# Patient Record
Sex: MALE | Race: WHITE | NOT HISPANIC OR LATINO | Employment: OTHER | URBAN - METROPOLITAN AREA
[De-identification: names, ages, dates, MRNs, and addresses within clinical notes are randomized per-mention and may not be internally consistent; named-entity substitution may affect disease eponyms.]

---

## 2024-01-28 ENCOUNTER — APPOINTMENT (OUTPATIENT)
Dept: CARDIOLOGY | Facility: MEDICAL CENTER | Age: 69
DRG: 322 | End: 2024-01-28
Attending: HOSPITALIST
Payer: MEDICARE

## 2024-01-28 ENCOUNTER — HOSPITAL ENCOUNTER (INPATIENT)
Facility: MEDICAL CENTER | Age: 69
LOS: 5 days | DRG: 322 | End: 2024-02-02
Attending: STUDENT IN AN ORGANIZED HEALTH CARE EDUCATION/TRAINING PROGRAM | Admitting: HOSPITALIST
Payer: MEDICARE

## 2024-01-28 DIAGNOSIS — I21.4 NSTEMI (NON-ST ELEVATED MYOCARDIAL INFARCTION) (HCC): ICD-10-CM

## 2024-01-28 DIAGNOSIS — A04.72 C. DIFFICILE COLITIS: ICD-10-CM

## 2024-01-28 PROBLEM — R73.03 PREDIABETES: Status: ACTIVE | Noted: 2024-01-28

## 2024-01-28 PROBLEM — Z98.890 HISTORY OF MITRAL VALVE REPAIR: Status: ACTIVE | Noted: 2024-01-28

## 2024-01-28 PROBLEM — R79.89 ELEVATED BRAIN NATRIURETIC PEPTIDE (BNP) LEVEL: Status: ACTIVE | Noted: 2024-01-28

## 2024-01-28 PROBLEM — I49.9 ARRHYTHMIA: Status: ACTIVE | Noted: 2024-01-28

## 2024-01-28 PROBLEM — R94.31 ABNORMAL EKG: Status: ACTIVE | Noted: 2024-01-28

## 2024-01-28 LAB
ALBUMIN SERPL BCP-MCNC: 4.1 G/DL (ref 3.2–4.9)
ALBUMIN/GLOB SERPL: 1.3 G/DL
ALP SERPL-CCNC: 43 U/L (ref 30–99)
ALT SERPL-CCNC: 49 U/L (ref 2–50)
ANION GAP SERPL CALC-SCNC: 10 MMOL/L (ref 7–16)
APTT PPP: 35.7 SEC (ref 24.7–36)
AST SERPL-CCNC: 25 U/L (ref 12–45)
BASOPHILS # BLD AUTO: 0.5 % (ref 0–1.8)
BASOPHILS # BLD: 0.06 K/UL (ref 0–0.12)
BILIRUB SERPL-MCNC: 0.6 MG/DL (ref 0.1–1.5)
BUN SERPL-MCNC: 14 MG/DL (ref 8–22)
CALCIUM ALBUM COR SERPL-MCNC: 9.2 MG/DL (ref 8.5–10.5)
CALCIUM SERPL-MCNC: 9.3 MG/DL (ref 8.5–10.5)
CHLORIDE SERPL-SCNC: 104 MMOL/L (ref 96–112)
CO2 SERPL-SCNC: 23 MMOL/L (ref 20–33)
CREAT SERPL-MCNC: 0.75 MG/DL (ref 0.5–1.4)
EOSINOPHIL # BLD AUTO: 0.05 K/UL (ref 0–0.51)
EOSINOPHIL NFR BLD: 0.4 % (ref 0–6.9)
ERYTHROCYTE [DISTWIDTH] IN BLOOD BY AUTOMATED COUNT: 48.3 FL (ref 35.9–50)
EST. AVERAGE GLUCOSE BLD GHB EST-MCNC: 123 MG/DL
GFR SERPLBLD CREATININE-BSD FMLA CKD-EPI: 99 ML/MIN/1.73 M 2
GLOBULIN SER CALC-MCNC: 3.1 G/DL (ref 1.9–3.5)
GLUCOSE BLD STRIP.AUTO-MCNC: 123 MG/DL (ref 65–99)
GLUCOSE BLD STRIP.AUTO-MCNC: 95 MG/DL (ref 65–99)
GLUCOSE BLD STRIP.AUTO-MCNC: 98 MG/DL (ref 65–99)
GLUCOSE SERPL-MCNC: 128 MG/DL (ref 65–99)
HBA1C MFR BLD: 5.9 % (ref 4–5.6)
HCT VFR BLD AUTO: 46.9 % (ref 42–52)
HGB BLD-MCNC: 15.9 G/DL (ref 14–18)
IMM GRANULOCYTES # BLD AUTO: 0.04 K/UL (ref 0–0.11)
IMM GRANULOCYTES NFR BLD AUTO: 0.3 % (ref 0–0.9)
INR PPP: 1 (ref 0.87–1.13)
LYMPHOCYTES # BLD AUTO: 0.81 K/UL (ref 1–4.8)
LYMPHOCYTES NFR BLD: 6.7 % (ref 22–41)
MCH RBC QN AUTO: 30.2 PG (ref 27–33)
MCHC RBC AUTO-ENTMCNC: 33.9 G/DL (ref 32.3–36.5)
MCV RBC AUTO: 89 FL (ref 81.4–97.8)
MONOCYTES # BLD AUTO: 0.79 K/UL (ref 0–0.85)
MONOCYTES NFR BLD AUTO: 6.6 % (ref 0–13.4)
NEUTROPHILS # BLD AUTO: 10.3 K/UL (ref 1.82–7.42)
NEUTROPHILS NFR BLD: 85.5 % (ref 44–72)
NRBC # BLD AUTO: 0 K/UL
NRBC BLD-RTO: 0 /100 WBC (ref 0–0.2)
NT-PROBNP SERPL IA-MCNC: 614 PG/ML (ref 0–125)
PLATELET # BLD AUTO: 219 K/UL (ref 164–446)
PMV BLD AUTO: 10.1 FL (ref 9–12.9)
POTASSIUM SERPL-SCNC: 3.9 MMOL/L (ref 3.6–5.5)
PROT SERPL-MCNC: 7.2 G/DL (ref 6–8.2)
PROTHROMBIN TIME: 13.3 SEC (ref 12–14.6)
RBC # BLD AUTO: 5.27 M/UL (ref 4.7–6.1)
SODIUM SERPL-SCNC: 137 MMOL/L (ref 135–145)
TROPONIN T SERPL-MCNC: 59 NG/L (ref 6–19)
TROPONIN T SERPL-MCNC: 62 NG/L (ref 6–19)
UFH PPP CHRO-ACNC: 0.15 IU/ML
UFH PPP CHRO-ACNC: 0.21 IU/ML
WBC # BLD AUTO: 12.1 K/UL (ref 4.8–10.8)

## 2024-01-28 PROCEDURE — 36415 COLL VENOUS BLD VENIPUNCTURE: CPT

## 2024-01-28 PROCEDURE — 82962 GLUCOSE BLOOD TEST: CPT | Mod: 91

## 2024-01-28 PROCEDURE — 85610 PROTHROMBIN TIME: CPT

## 2024-01-28 PROCEDURE — 84484 ASSAY OF TROPONIN QUANT: CPT

## 2024-01-28 PROCEDURE — 700102 HCHG RX REV CODE 250 W/ 637 OVERRIDE(OP)

## 2024-01-28 PROCEDURE — 85730 THROMBOPLASTIN TIME PARTIAL: CPT

## 2024-01-28 PROCEDURE — 80053 COMPREHEN METABOLIC PANEL: CPT

## 2024-01-28 PROCEDURE — A9270 NON-COVERED ITEM OR SERVICE: HCPCS

## 2024-01-28 PROCEDURE — 99223 1ST HOSP IP/OBS HIGH 75: CPT | Performed by: HOSPITALIST

## 2024-01-28 PROCEDURE — 83036 HEMOGLOBIN GLYCOSYLATED A1C: CPT

## 2024-01-28 PROCEDURE — 93005 ELECTROCARDIOGRAM TRACING: CPT | Performed by: HOSPITALIST

## 2024-01-28 PROCEDURE — 83880 ASSAY OF NATRIURETIC PEPTIDE: CPT

## 2024-01-28 PROCEDURE — 99223 1ST HOSP IP/OBS HIGH 75: CPT | Performed by: INTERNAL MEDICINE

## 2024-01-28 PROCEDURE — 85025 COMPLETE CBC W/AUTO DIFF WBC: CPT

## 2024-01-28 PROCEDURE — A9270 NON-COVERED ITEM OR SERVICE: HCPCS | Performed by: HOSPITALIST

## 2024-01-28 PROCEDURE — 85520 HEPARIN ASSAY: CPT

## 2024-01-28 PROCEDURE — 770020 HCHG ROOM/CARE - TELE (206)

## 2024-01-28 PROCEDURE — 700102 HCHG RX REV CODE 250 W/ 637 OVERRIDE(OP): Performed by: HOSPITALIST

## 2024-01-28 PROCEDURE — 99291 CRITICAL CARE FIRST HOUR: CPT | Performed by: HOSPITALIST

## 2024-01-28 PROCEDURE — 700111 HCHG RX REV CODE 636 W/ 250 OVERRIDE (IP): Performed by: HOSPITALIST

## 2024-01-28 RX ORDER — POLYETHYLENE GLYCOL 3350 17 G/17G
1 POWDER, FOR SOLUTION ORAL
Status: DISCONTINUED | OUTPATIENT
Start: 2024-01-28 | End: 2024-01-29

## 2024-01-28 RX ORDER — OXYCODONE HYDROCHLORIDE 5 MG/1
2.5 TABLET ORAL
Status: DISCONTINUED | OUTPATIENT
Start: 2024-01-28 | End: 2024-02-02 | Stop reason: HOSPADM

## 2024-01-28 RX ORDER — HEPARIN SODIUM 1000 [USP'U]/ML
2000 INJECTION, SOLUTION INTRAVENOUS; SUBCUTANEOUS PRN
Status: DISCONTINUED | OUTPATIENT
Start: 2024-01-28 | End: 2024-02-01

## 2024-01-28 RX ORDER — NITROGLYCERIN 0.4 MG/1
0.4 TABLET SUBLINGUAL
Status: DISCONTINUED | OUTPATIENT
Start: 2024-01-28 | End: 2024-02-02 | Stop reason: HOSPADM

## 2024-01-28 RX ORDER — BISACODYL 10 MG
10 SUPPOSITORY, RECTAL RECTAL
Status: DISCONTINUED | OUTPATIENT
Start: 2024-01-28 | End: 2024-01-29

## 2024-01-28 RX ORDER — HYDROMORPHONE HYDROCHLORIDE 1 MG/ML
0.25 INJECTION, SOLUTION INTRAMUSCULAR; INTRAVENOUS; SUBCUTANEOUS
Status: DISCONTINUED | OUTPATIENT
Start: 2024-01-28 | End: 2024-02-02 | Stop reason: HOSPADM

## 2024-01-28 RX ORDER — ONDANSETRON 2 MG/ML
4 INJECTION INTRAMUSCULAR; INTRAVENOUS EVERY 4 HOURS PRN
Status: DISCONTINUED | OUTPATIENT
Start: 2024-01-28 | End: 2024-02-02 | Stop reason: HOSPADM

## 2024-01-28 RX ORDER — OXYCODONE HYDROCHLORIDE 5 MG/1
5 TABLET ORAL
Status: DISCONTINUED | OUTPATIENT
Start: 2024-01-28 | End: 2024-02-02 | Stop reason: HOSPADM

## 2024-01-28 RX ORDER — DEXTROSE MONOHYDRATE 25 G/50ML
25 INJECTION, SOLUTION INTRAVENOUS
Status: DISCONTINUED | OUTPATIENT
Start: 2024-01-28 | End: 2024-02-02 | Stop reason: HOSPADM

## 2024-01-28 RX ORDER — ACETAMINOPHEN 325 MG/1
650 TABLET ORAL EVERY 6 HOURS PRN
Status: DISCONTINUED | OUTPATIENT
Start: 2024-01-28 | End: 2024-02-02 | Stop reason: HOSPADM

## 2024-01-28 RX ORDER — ONDANSETRON 4 MG/1
4 TABLET, ORALLY DISINTEGRATING ORAL EVERY 4 HOURS PRN
Status: DISCONTINUED | OUTPATIENT
Start: 2024-01-28 | End: 2024-02-02 | Stop reason: HOSPADM

## 2024-01-28 RX ORDER — ENOXAPARIN SODIUM 100 MG/ML
40 INJECTION SUBCUTANEOUS DAILY
Status: DISCONTINUED | OUTPATIENT
Start: 2024-01-28 | End: 2024-01-28

## 2024-01-28 RX ORDER — HEPARIN SODIUM 5000 [USP'U]/100ML
0-30 INJECTION, SOLUTION INTRAVENOUS CONTINUOUS
Status: DISCONTINUED | OUTPATIENT
Start: 2024-01-28 | End: 2024-02-01

## 2024-01-28 RX ORDER — SIMETHICONE 125 MG
125 TABLET,CHEWABLE ORAL 3 TIMES DAILY PRN
Status: DISCONTINUED | OUTPATIENT
Start: 2024-01-28 | End: 2024-02-02 | Stop reason: HOSPADM

## 2024-01-28 RX ORDER — LISINOPRIL 5 MG/1
2.5 TABLET ORAL
Status: DISCONTINUED | OUTPATIENT
Start: 2024-01-28 | End: 2024-01-30

## 2024-01-28 RX ORDER — AMOXICILLIN 250 MG
2 CAPSULE ORAL 2 TIMES DAILY
Status: DISCONTINUED | OUTPATIENT
Start: 2024-01-28 | End: 2024-01-29

## 2024-01-28 RX ORDER — CHOLECALCIFEROL (VITAMIN D3) 125 MCG
5 CAPSULE ORAL NIGHTLY PRN
Status: DISCONTINUED | OUTPATIENT
Start: 2024-01-28 | End: 2024-02-02 | Stop reason: HOSPADM

## 2024-01-28 RX ORDER — ASPIRIN 81 MG/1
81 TABLET ORAL DAILY
Status: DISCONTINUED | OUTPATIENT
Start: 2024-01-29 | End: 2024-02-02 | Stop reason: HOSPADM

## 2024-01-28 RX ORDER — ATORVASTATIN CALCIUM 40 MG/1
40 TABLET, FILM COATED ORAL EVERY EVENING
Status: DISCONTINUED | OUTPATIENT
Start: 2024-01-28 | End: 2024-01-28

## 2024-01-28 RX ORDER — CARVEDILOL 3.12 MG/1
3.12 TABLET ORAL 2 TIMES DAILY WITH MEALS
Status: DISCONTINUED | OUTPATIENT
Start: 2024-01-28 | End: 2024-01-30

## 2024-01-28 RX ADMIN — LISINOPRIL 2.5 MG: 5 TABLET ORAL at 14:10

## 2024-01-28 RX ADMIN — Medication 5 MG: at 22:16

## 2024-01-28 RX ADMIN — ACETAMINOPHEN 650 MG: 325 TABLET, FILM COATED ORAL at 14:03

## 2024-01-28 RX ADMIN — HEPARIN SODIUM 2000 UNITS: 1000 INJECTION, SOLUTION INTRAVENOUS; SUBCUTANEOUS at 22:10

## 2024-01-28 RX ADMIN — DOCUSATE SODIUM 50 MG AND SENNOSIDES 8.6 MG 2 TABLET: 8.6; 5 TABLET, FILM COATED ORAL at 17:38

## 2024-01-28 RX ADMIN — POLYETHYLENE GLYCOL 3350 1 PACKET: 17 POWDER, FOR SOLUTION ORAL at 20:16

## 2024-01-28 RX ADMIN — HEPARIN SODIUM 2000 UNITS: 1000 INJECTION, SOLUTION INTRAVENOUS; SUBCUTANEOUS at 13:55

## 2024-01-28 RX ADMIN — ACETAMINOPHEN 650 MG: 325 TABLET, FILM COATED ORAL at 20:15

## 2024-01-28 RX ADMIN — CARVEDILOL 3.12 MG: 3.12 TABLET, FILM COATED ORAL at 14:11

## 2024-01-28 RX ADMIN — HEPARIN SODIUM 12 UNITS/KG/HR: 5000 INJECTION, SOLUTION INTRAVENOUS at 13:44

## 2024-01-28 ASSESSMENT — PAIN DESCRIPTION - PAIN TYPE
TYPE: ACUTE PAIN
TYPE: ACUTE PAIN

## 2024-01-28 ASSESSMENT — ENCOUNTER SYMPTOMS
ORTHOPNEA: 0
MYALGIAS: 0
BRUISES/BLEEDS EASILY: 0
FOCAL WEAKNESS: 0
STRIDOR: 0
DIAPHORESIS: 0
ABDOMINAL PAIN: 0
VOMITING: 0
WHEEZING: 0
EYE DISCHARGE: 0
FLANK PAIN: 0
COUGH: 0
DYSPNEA ON EXERTION: 0
HEMATOCHEZIA: 0
NERVOUS/ANXIOUS: 0
SYNCOPE: 0
PND: 0
NEAR-SYNCOPE: 0
FEVER: 0
HEMOPTYSIS: 0
RESPIRATORY NEGATIVE: 1
EYE REDNESS: 0
SHORTNESS OF BREATH: 0
CHILLS: 0
PALPITATIONS: 0

## 2024-01-28 ASSESSMENT — LIFESTYLE VARIABLES
EVER FELT BAD OR GUILTY ABOUT YOUR DRINKING: YES
HAVE PEOPLE ANNOYED YOU BY CRITICIZING YOUR DRINKING: YES
ALCOHOL_USE: YES
TOTAL SCORE: 3
HAVE YOU EVER FELT YOU SHOULD CUT DOWN ON YOUR DRINKING: YES
TOTAL SCORE: 3
AVERAGE NUMBER OF DAYS PER WEEK YOU HAVE A DRINK CONTAINING ALCOHOL: 0
DOES PATIENT WANT TO STOP DRINKING: NO
CONSUMPTION TOTAL: POSITIVE
EVER HAD A DRINK FIRST THING IN THE MORNING TO STEADY YOUR NERVES TO GET RID OF A HANGOVER: NO
ON A TYPICAL DAY WHEN YOU DRINK ALCOHOL HOW MANY DRINKS DO YOU HAVE: 0
TOTAL SCORE: 3
HOW MANY TIMES IN THE PAST YEAR HAVE YOU HAD 5 OR MORE DRINKS IN A DAY: 1

## 2024-01-28 NOTE — PROGRESS NOTES
[facility] TRIAGE [facility]ICER DIRECT ADMISSION REPORT    68-year-old man with history of hypertension, A-fib and DM2 presenting with chest pain for 4 hours before presenting to the ED.  Some ST depressions on EKG however this improved per ED PA.  Troponins milana from undetectable to 0.0320.1 (upper limit of normal 0.04).  Given aspirin and nitroglycerin, started on heparin drip.  Needs transfer for higher level care and cardiology.     Please inform the triage officer upon arrival of the patient to [facility] for assignment of a hospitalist to perform admission.      For any question or concerns regarding the care of this patient, please reach out to the assigned hospita

## 2024-01-28 NOTE — ASSESSMENT & PLAN NOTE
No history of atrial fibrillation  EKG and telemetry shows sinus rhythm  On heparin gtt    -- cards following

## 2024-01-28 NOTE — ASSESSMENT & PLAN NOTE
ST depression and T inversion on lateral leads   continue aspirin with heparin drip    Cardiac cath s/p stent on LAD and RCA

## 2024-01-28 NOTE — ASSESSMENT & PLAN NOTE
Pressure chest pain with this changes on EKG and mildly elevated troponin  Started with heparin drip, aspirin and rosuvastatin, acei and bb    -- echo reviewed, cards called and planning for cardiac cath    --npo now, will be going for cardiac cath    On 1/30, cardiac cath could not complete due to complicated emergency  2/1: Underwent cardiac cath, had PCI done in distal LAD and RCA

## 2024-01-28 NOTE — H&P
Hospital Medicine History & Physical Note    Date of Service  1/28/2024    Primary Care Physician  No primary care provider on file.    Consultants  Cardiology, Dr. Carranza    Code Status  Full Code     Chief Complaint  Chest pain    History of Presenting Illness  Osman Guzmán is a 68 y.o. male with a past medical history of remote open heart surgery for valve replacement about 10-15 years ago not currently on any medications who presented 1/28/2024 as a transfer there for chest pain and abnormal EKG changes concerning for non-ST elevation myocardial infarction.  Patient has been having multiple episodes of transient chest pain lasting about 5 minutes.  The pain is retrosternal location.  Patient did receive nitro at the transferring facility that relieved his pain.  He denies having shortness of breath.  He denies having left stomach pain redness or swelling.  Otherwise the patient reports he has a good health and he practices boxing for 10-15 minutes twice a week.     I discussed the plan of care with telemetry nursing staff, cardiology on-call, Dr. Carranza.    Review of Systems  Review of Systems   Constitutional:  Negative for chills and fever.   Eyes:  Negative for discharge and redness.   Respiratory:  Negative for cough, shortness of breath and stridor.    Cardiovascular:  Positive for chest pain. Negative for leg swelling.   Gastrointestinal:  Negative for abdominal pain and vomiting.   Genitourinary:  Negative for flank pain.   Musculoskeletal:  Negative for myalgias.   Skin: Negative.    Neurological:  Negative for focal weakness.   Endo/Heme/Allergies:  Does not bruise/bleed easily.   Psychiatric/Behavioral:  The patient is not nervous/anxious.      Past Medical History   has no past medical history on file.    Surgical History   has no past surgical history on file.     Family History  family history is not on file.      Social History       Allergies  Allergies   Allergen Reactions    Lipitor [Atorvastatin]       Severe muscle crumpling        Medications  None     Physical Exam  Temp:  [36.3 °C (97.3 °F)-36.6 °C (97.9 °F)] 36.6 °C (97.9 °F)  Pulse:  [78-80] 79  Resp:  [14] 14  BP: (152-161)/() 157/99  SpO2:  [93 %-94 %] 93 %                        Physical Exam  Constitutional:       General: He is not in acute distress.     Appearance: He is not ill-appearing or diaphoretic.   HENT:      Head: Atraumatic.      Right Ear: External ear normal.      Left Ear: External ear normal.      Nose: No congestion or rhinorrhea.      Mouth/Throat:      Mouth: Mucous membranes are moist.   Eyes:      General: No scleral icterus.        Right eye: No discharge.         Left eye: No discharge.      Pupils: Pupils are equal, round, and reactive to light.   Cardiovascular:      Rate and Rhythm: Normal rate and regular rhythm.   Pulmonary:      Effort: Pulmonary effort is normal.   Abdominal:      General: There is no distension.   Musculoskeletal:      Cervical back: Neck supple. No rigidity. No muscular tenderness.      Right lower leg: No edema.      Left lower leg: No edema.   Skin:     Coloration: Skin is not jaundiced or pale.   Neurological:      Mental Status: He is alert and oriented to person, place, and time.      Coordination: Coordination normal.   Psychiatric:         Mood and Affect: Mood normal.         Behavior: Behavior normal.       Laboratory:  Recent Labs     01/28/24  1110   WBC 12.1*   RBC 5.27   HEMOGLOBIN 15.9   HEMATOCRIT 46.9   MCV 89.0   MCH 30.2   MCHC 33.9   RDW 48.3   PLATELETCT 219   MPV 10.1     Recent Labs     01/28/24  1110   SODIUM 137   POTASSIUM 3.9   CHLORIDE 104   CO2 23   GLUCOSE 128*   BUN 14   CREATININE 0.75   CALCIUM 9.3     Recent Labs     01/28/24  1110   ALTSGPT 49   ASTSGOT 25   ALKPHOSPHAT 43   TBILIRUBIN 0.6   GLUCOSE 128*     Recent Labs     01/28/24  1110   APTT 35.7   INR 1.00     Recent Labs     01/28/24  1110   NTPROBNP 614*         Recent Labs     01/28/24  1110  01/28/24  1428   TROPONINT 59* 62*     Imaging:  EC-ECHOCARDIOGRAM COMPLETE W/O CONT    (Results Pending)     I personally reviewed patient EKG shows sinus rhythm with a rate of 79, there is minimal ST elevation at lead III.  There is 1 mm ST depression with T wave inversion in lead I, 2, aVL, leads V3-V4.  QTc is 433.    Assessment/Plan:  Justification for Admission Status  I anticipate this patient will require at least two midnights for appropriate medical management, necessitating inpatient admission because patient has non-STEMI.  Will require heparin drip.  Cardiology consultation and likely cath evaluation.    Patient will need a Telemetry bed on CARDIOLOGY service.  The patient has has non-STEMI.     * NSTEMI (non-ST elevated myocardial infarction) ([facility])- (present on admission)  Assessment & Plan  The patient has intermittent episodes of chest pain at rest the pain was with the use of nitro at the transferring facility  EKG shows sinus rhythm with a rate of 79, there is minimal ST elevation at lead III.  There is 1 mm ST depression with T wave inversion in lead I, 2, aVL, leads V3-V4.  QTc is 433.  Troponin is elevated.  I discussed the case with cardiology on-call, appreciate recommendations.  Will keep the patient n.p.o. after midnight for potential cath study tomorrow.  I will start heparin drip, aspirin.  The patient does not tolerate statin.  I will start carvedilol with hold parameters.  I will start lisinopril.  With hold parameters.      Arrhythmia possible atrial fibrillation- (present on admission)  Assessment & Plan  Transferred report indicates the patient has atrial fibrillation.  Patient is not aware of this diagnosis.  No EKG documentation to support this.  I will start the patient on continuous cardiac monitoring.  I will start the patient on carvedilol with hold parameters given his non-STEMI.  I will start the patient on heparin drip for now given his non-STEMI.  I will check an EKG.  I  will check thyroid function test.    Elevated brain natriuretic peptide (BNP) level- (present on admission)  Assessment & Plan  I will check an echocardiographic    Abnormal EKG- (present on admission)  Assessment & Plan  EKG shows sinus rhythm with a rate of 79, there is minimal ST elevation at lead III.  There is 1 mm ST depression with T wave inversion in lead I, 2, aVL, leads V3-V4.  QTc is 433.    I will place on continuous cardiac monitoring  I will check and trend troponins  I will check echocardiography       Prediabetes- (present on admission)  Assessment & Plan  Without significant hyperglycemia.    I will check a glycated hemoglobin  Monitor blood sugars with daily labs.  I will order a follow-up glucose level.  Consider sliding scale insulin according to blood sugar trend.        VTE prophylaxis: SCDs/TEDs and therapeutic anticoagulation with heparin drip.    Patient is critically ill.   The patient continues to have: abnormal EKG and troponin elevation concerning for NSTEMI  The vital organ system that is affected is the: heart, cardiovascular system  If untreated there is a high chance of deterioration into: further cardiac ischemia, arrhythmia, cardiogenic shock and eventually death.   The critical care that I am providing today is: starting titratable heparin drip, antiplatelet therapy, beta blockers, ACI inhibitor and I discussed with cardiology to consider an angiography   The critical that has been undertaken is medically complex.   There has been no overlap in critical care time.   Critical Care Time not including procedures: 72 minutes

## 2024-01-29 ENCOUNTER — APPOINTMENT (OUTPATIENT)
Dept: CARDIOLOGY | Facility: MEDICAL CENTER | Age: 69
DRG: 322 | End: 2024-01-29
Attending: HOSPITALIST
Payer: MEDICARE

## 2024-01-29 ENCOUNTER — APPOINTMENT (OUTPATIENT)
Dept: RADIOLOGY | Facility: MEDICAL CENTER | Age: 69
DRG: 322 | End: 2024-01-29
Attending: INTERNAL MEDICINE
Payer: MEDICARE

## 2024-01-29 PROBLEM — I27.20 PULMONARY HYPERTENSION (HCC): Status: ACTIVE | Noted: 2024-01-29

## 2024-01-29 PROBLEM — R19.7 DIARRHEA: Status: ACTIVE | Noted: 2024-01-29

## 2024-01-29 PROBLEM — A04.72 C. DIFFICILE COLITIS: Status: ACTIVE | Noted: 2024-01-29

## 2024-01-29 LAB
ALBUMIN SERPL BCP-MCNC: 3.7 G/DL (ref 3.2–4.9)
ALBUMIN/GLOB SERPL: 1.2 G/DL
ALP SERPL-CCNC: 40 U/L (ref 30–99)
ALT SERPL-CCNC: 38 U/L (ref 2–50)
ANION GAP SERPL CALC-SCNC: 10 MMOL/L (ref 7–16)
AST SERPL-CCNC: 24 U/L (ref 12–45)
BILIRUB SERPL-MCNC: 0.5 MG/DL (ref 0.1–1.5)
BUN SERPL-MCNC: 12 MG/DL (ref 8–22)
C DIFF DNA SPEC QL NAA+PROBE: NEGATIVE
C DIFF TOX A+B STL QL IA: POSITIVE
C DIFF TOX GENS STL QL NAA+PROBE: NORMAL
CALCIUM ALBUM COR SERPL-MCNC: 9.3 MG/DL (ref 8.5–10.5)
CALCIUM SERPL-MCNC: 9.1 MG/DL (ref 8.5–10.5)
CHLORIDE SERPL-SCNC: 103 MMOL/L (ref 96–112)
CHOLEST SERPL-MCNC: 240 MG/DL (ref 100–199)
CO2 SERPL-SCNC: 23 MMOL/L (ref 20–33)
CREAT SERPL-MCNC: 0.79 MG/DL (ref 0.5–1.4)
EKG IMPRESSION: NORMAL
EKG IMPRESSION: NORMAL
ERYTHROCYTE [DISTWIDTH] IN BLOOD BY AUTOMATED COUNT: 48.6 FL (ref 35.9–50)
GFR SERPLBLD CREATININE-BSD FMLA CKD-EPI: 97 ML/MIN/1.73 M 2
GLOBULIN SER CALC-MCNC: 3.1 G/DL (ref 1.9–3.5)
GLUCOSE BLD STRIP.AUTO-MCNC: 104 MG/DL (ref 65–99)
GLUCOSE BLD STRIP.AUTO-MCNC: 113 MG/DL (ref 65–99)
GLUCOSE BLD STRIP.AUTO-MCNC: 117 MG/DL (ref 65–99)
GLUCOSE BLD STRIP.AUTO-MCNC: 98 MG/DL (ref 65–99)
GLUCOSE SERPL-MCNC: 131 MG/DL (ref 65–99)
HCT VFR BLD AUTO: 45.4 % (ref 42–52)
HDLC SERPL-MCNC: 73 MG/DL
HGB BLD-MCNC: 15.7 G/DL (ref 14–18)
LACTOFERRIN STL QL IA: POSITIVE
LDLC SERPL CALC-MCNC: 143 MG/DL
LV EJECT FRACT  99904: 55
LV EJECT FRACT MOD 2C 99903: 56.89
LV EJECT FRACT MOD 4C 99902: 44.32
LV EJECT FRACT MOD BP 99901: 51.06
MAGNESIUM SERPL-MCNC: 1.6 MG/DL (ref 1.5–2.5)
MCH RBC QN AUTO: 30.6 PG (ref 27–33)
MCHC RBC AUTO-ENTMCNC: 34.6 G/DL (ref 32.3–36.5)
MCV RBC AUTO: 88.5 FL (ref 81.4–97.8)
PLATELET # BLD AUTO: 194 K/UL (ref 164–446)
PMV BLD AUTO: 11 FL (ref 9–12.9)
POTASSIUM SERPL-SCNC: 4 MMOL/L (ref 3.6–5.5)
PROT SERPL-MCNC: 6.8 G/DL (ref 6–8.2)
RBC # BLD AUTO: 5.13 M/UL (ref 4.7–6.1)
SODIUM SERPL-SCNC: 136 MMOL/L (ref 135–145)
TRIGL SERPL-MCNC: 120 MG/DL (ref 0–149)
TROPONIN T SERPL-MCNC: 45 NG/L (ref 6–19)
TSH SERPL DL<=0.005 MIU/L-ACNC: 1.84 UIU/ML (ref 0.38–5.33)
UFH PPP CHRO-ACNC: 0.42 IU/ML
UFH PPP CHRO-ACNC: 0.43 IU/ML
WBC # BLD AUTO: 12 K/UL (ref 4.8–10.8)

## 2024-01-29 PROCEDURE — 80053 COMPREHEN METABOLIC PANEL: CPT

## 2024-01-29 PROCEDURE — 93010 ELECTROCARDIOGRAM REPORT: CPT | Performed by: INTERNAL MEDICINE

## 2024-01-29 PROCEDURE — 87046 STOOL CULTR AEROBIC BACT EA: CPT

## 2024-01-29 PROCEDURE — A9270 NON-COVERED ITEM OR SERVICE: HCPCS | Performed by: HOSPITALIST

## 2024-01-29 PROCEDURE — 93306 TTE W/DOPPLER COMPLETE: CPT

## 2024-01-29 PROCEDURE — 84443 ASSAY THYROID STIM HORMONE: CPT

## 2024-01-29 PROCEDURE — 700101 HCHG RX REV CODE 250: Performed by: INTERNAL MEDICINE

## 2024-01-29 PROCEDURE — 71275 CT ANGIOGRAPHY CHEST: CPT

## 2024-01-29 PROCEDURE — 93306 TTE W/DOPPLER COMPLETE: CPT | Mod: 26 | Performed by: INTERNAL MEDICINE

## 2024-01-29 PROCEDURE — 80061 LIPID PANEL: CPT

## 2024-01-29 PROCEDURE — 83630 LACTOFERRIN FECAL (QUAL): CPT

## 2024-01-29 PROCEDURE — 700102 HCHG RX REV CODE 250 W/ 637 OVERRIDE(OP): Performed by: HOSPITALIST

## 2024-01-29 PROCEDURE — 770020 HCHG ROOM/CARE - TELE (206)

## 2024-01-29 PROCEDURE — 87493 C DIFF AMPLIFIED PROBE: CPT

## 2024-01-29 PROCEDURE — 700102 HCHG RX REV CODE 250 W/ 637 OVERRIDE(OP): Performed by: INTERNAL MEDICINE

## 2024-01-29 PROCEDURE — 700111 HCHG RX REV CODE 636 W/ 250 OVERRIDE (IP): Performed by: INTERNAL MEDICINE

## 2024-01-29 PROCEDURE — A9270 NON-COVERED ITEM OR SERVICE: HCPCS

## 2024-01-29 PROCEDURE — 82962 GLUCOSE BLOOD TEST: CPT | Mod: 91

## 2024-01-29 PROCEDURE — 700105 HCHG RX REV CODE 258: Performed by: INTERNAL MEDICINE

## 2024-01-29 PROCEDURE — 93005 ELECTROCARDIOGRAM TRACING: CPT | Performed by: INTERNAL MEDICINE

## 2024-01-29 PROCEDURE — 700117 HCHG RX CONTRAST REV CODE 255: Performed by: INTERNAL MEDICINE

## 2024-01-29 PROCEDURE — A9270 NON-COVERED ITEM OR SERVICE: HCPCS | Performed by: INTERNAL MEDICINE

## 2024-01-29 PROCEDURE — 99233 SBSQ HOSP IP/OBS HIGH 50: CPT | Performed by: INTERNAL MEDICINE

## 2024-01-29 PROCEDURE — 36415 COLL VENOUS BLD VENIPUNCTURE: CPT

## 2024-01-29 PROCEDURE — 93010 ELECTROCARDIOGRAM REPORT: CPT | Mod: 77 | Performed by: INTERNAL MEDICINE

## 2024-01-29 PROCEDURE — 84484 ASSAY OF TROPONIN QUANT: CPT

## 2024-01-29 PROCEDURE — 700111 HCHG RX REV CODE 636 W/ 250 OVERRIDE (IP): Performed by: HOSPITALIST

## 2024-01-29 PROCEDURE — 85520 HEPARIN ASSAY: CPT | Mod: 91

## 2024-01-29 PROCEDURE — 87045 FECES CULTURE AEROBIC BACT: CPT

## 2024-01-29 PROCEDURE — 85027 COMPLETE CBC AUTOMATED: CPT

## 2024-01-29 PROCEDURE — 83735 ASSAY OF MAGNESIUM: CPT

## 2024-01-29 PROCEDURE — 700102 HCHG RX REV CODE 250 W/ 637 OVERRIDE(OP)

## 2024-01-29 PROCEDURE — 87899 AGENT NOS ASSAY W/OPTIC: CPT

## 2024-01-29 PROCEDURE — 87324 CLOSTRIDIUM AG IA: CPT

## 2024-01-29 RX ORDER — LOPERAMIDE HYDROCHLORIDE 2 MG/1
2 CAPSULE ORAL 4 TIMES DAILY PRN
Status: DISCONTINUED | OUTPATIENT
Start: 2024-01-29 | End: 2024-01-29

## 2024-01-29 RX ORDER — ROSUVASTATIN CALCIUM 10 MG/1
10 TABLET, COATED ORAL EVERY EVENING
Status: DISCONTINUED | OUTPATIENT
Start: 2024-01-29 | End: 2024-02-02 | Stop reason: HOSPADM

## 2024-01-29 RX ORDER — DICYCLOMINE HYDROCHLORIDE 10 MG/1
10 CAPSULE ORAL EVERY 6 HOURS PRN
Status: DISCONTINUED | OUTPATIENT
Start: 2024-01-29 | End: 2024-01-30

## 2024-01-29 RX ORDER — SODIUM CHLORIDE, SODIUM LACTATE, POTASSIUM CHLORIDE, CALCIUM CHLORIDE 600; 310; 30; 20 MG/100ML; MG/100ML; MG/100ML; MG/100ML
INJECTION, SOLUTION INTRAVENOUS CONTINUOUS
Status: DISCONTINUED | OUTPATIENT
Start: 2024-01-29 | End: 2024-02-01

## 2024-01-29 RX ORDER — METRONIDAZOLE 500 MG/1
500 TABLET ORAL EVERY 12 HOURS
Status: DISCONTINUED | OUTPATIENT
Start: 2024-01-29 | End: 2024-01-29

## 2024-01-29 RX ADMIN — VANCOMYCIN HYDROCHLORIDE 125 MG: 5 INJECTION, POWDER, LYOPHILIZED, FOR SOLUTION INTRAVENOUS at 18:55

## 2024-01-29 RX ADMIN — IOHEXOL 100 ML: 350 INJECTION, SOLUTION INTRAVENOUS at 17:05

## 2024-01-29 RX ADMIN — HEPARIN SODIUM 14 UNITS/KG/HR: 5000 INJECTION, SOLUTION INTRAVENOUS at 08:09

## 2024-01-29 RX ADMIN — DICYCLOMINE HYDROCHLORIDE 10 MG: 10 CAPSULE ORAL at 04:17

## 2024-01-29 RX ADMIN — SIMETHICONE 125 MG: 125 TABLET, CHEWABLE ORAL at 00:41

## 2024-01-29 RX ADMIN — CARVEDILOL 3.12 MG: 3.12 TABLET, FILM COATED ORAL at 18:59

## 2024-01-29 RX ADMIN — OXYCODONE 5 MG: 5 TABLET ORAL at 23:51

## 2024-01-29 RX ADMIN — VANCOMYCIN HYDROCHLORIDE 125 MG: 5 INJECTION, POWDER, LYOPHILIZED, FOR SOLUTION INTRAVENOUS at 23:46

## 2024-01-29 RX ADMIN — ASPIRIN 81 MG: 81 TABLET, COATED ORAL at 06:01

## 2024-01-29 RX ADMIN — ROSUVASTATIN 10 MG: 10 TABLET, FILM COATED ORAL at 18:55

## 2024-01-29 RX ADMIN — LOPERAMIDE HYDROCHLORIDE 2 MG: 2 CAPSULE ORAL at 04:03

## 2024-01-29 RX ADMIN — DICYCLOMINE HYDROCHLORIDE 10 MG: 10 CAPSULE ORAL at 19:46

## 2024-01-29 RX ADMIN — SODIUM CHLORIDE, POTASSIUM CHLORIDE, SODIUM LACTATE AND CALCIUM CHLORIDE: 600; 310; 30; 20 INJECTION, SOLUTION INTRAVENOUS at 20:26

## 2024-01-29 RX ADMIN — CARVEDILOL 3.12 MG: 3.12 TABLET, FILM COATED ORAL at 07:57

## 2024-01-29 RX ADMIN — VANCOMYCIN HYDROCHLORIDE 125 MG: 5 INJECTION, POWDER, LYOPHILIZED, FOR SOLUTION INTRAVENOUS at 14:59

## 2024-01-29 RX ADMIN — LISINOPRIL 2.5 MG: 5 TABLET ORAL at 06:01

## 2024-01-29 ASSESSMENT — ENCOUNTER SYMPTOMS
BRUISES/BLEEDS EASILY: 0
NAUSEA: 0
CONSTIPATION: 0
ABDOMINAL PAIN: 0
DIZZINESS: 0
WEAKNESS: 0
SPEECH CHANGE: 0
SHORTNESS OF BREATH: 0
COUGH: 0
BLURRED VISION: 0
DIARRHEA: 1
FATIGUE: 0
DIAPHORESIS: 0
CHEST TIGHTNESS: 0
EYES NEGATIVE: 1
ABDOMINAL PAIN: 1
NECK PAIN: 0
DOUBLE VISION: 0
ENDOCRINE NEGATIVE: 1
FEVER: 0
PALPITATIONS: 0
PHOTOPHOBIA: 0
HEMOPTYSIS: 0
COLOR CHANGE: 0
VOMITING: 0
PSYCHIATRIC NEGATIVE: 1
ABDOMINAL DISTENTION: 0
MYALGIAS: 0
CLAUDICATION: 0
ARTHRALGIAS: 0
WEIGHT LOSS: 0
ORTHOPNEA: 0
LIGHT-HEADEDNESS: 0
CHILLS: 0

## 2024-01-29 ASSESSMENT — PAIN DESCRIPTION - PAIN TYPE
TYPE: ACUTE PAIN

## 2024-01-29 ASSESSMENT — COGNITIVE AND FUNCTIONAL STATUS - GENERAL
SUGGESTED CMS G CODE MODIFIER DAILY ACTIVITY: CH
DAILY ACTIVITIY SCORE: 24
MOBILITY SCORE: 24
SUGGESTED CMS G CODE MODIFIER MOBILITY: CH

## 2024-01-29 ASSESSMENT — FIBROSIS 4 INDEX: FIB4 SCORE: 1.09

## 2024-01-29 NOTE — PROGRESS NOTES
Bedside report received from NOC RN. Assumed care of pt. Pt awake, laying in bed. A/Ox4, VSS. Pt educated to call before getting out of bed. POC reviewed and white board updated. Tele box on. SR 70 on the monitor. Call light in reach. Bed locked in lowest position with 2 upper bed rails up. Bed alarm on.

## 2024-01-29 NOTE — CARE PLAN
The patient is Stable - Low risk of patient condition declining or worsening    Shift Goals  Clinical Goals: monitor labs, vitals, heparin gtt  Patient Goals: rest, comfort    Progress made toward(s) clinical / shift goals:      Problem: Knowledge Deficit - Standard  Goal: Patient and family/care givers will demonstrate understanding of plan of care, disease process/condition, diagnostic tests and medications  Outcome: Progressing       Problem: Psychosocial  Goal: Patient's ability to verbalize feelings about condition will improve  Outcome: Progressing     Problem: Communication  Goal: The ability to communicate needs accurately and effectively will improve  Outcome: Progressing     Problem: Mobility  Goal: Patient's capacity to carry out activities will improve  Outcome: Progressing        Patient is not progressing towards the following goals:

## 2024-01-29 NOTE — CONSULTS
Cardiology Initial Consult Note    Date of note:    1/28/2024      Consulting Physician: David Nina M.D.    Name:   Osman Guzmán     YOB: 1955  Age:   68 y.o.  male   MRN:   [id number]    Reason for Consultation: chest pain, elevated troponin    HPI:  Osman Guzmán is a 68 y.o. male with a history of mitral valve repair 15 years ago in [place], had post op a. Fib after surgery, on no medications, presented to outside hospital with sudden onset of chest pain yesterday around noon.  It started with jaw pain, also shoulder pain, it then traveled down to the chest and went down his left arm.  This lasted for about 2 hours.  He went into clinic.  He reports they gave him total of 4 sublingual nitroglycerin which helped and chest pain almost resolved.  He was then sent over to the emergency room.  Records are not available, was told troponin was mildly elevated.  Patient was transferred to [facility] for further management.    Patient currently is on heparin drip.  He reports there is no recurrent's of symptoms.    The patient reports he had been in his usual state of health without any complaints.  On Friday, he did have 2 pints of alcohol.  The reason he did this was his son joined the [employer] and he does not know where to find them.  He reports he does not have any history of heavy drinking in the past.    Problem list:  Principal Problem:    NSTEMI (non-ST elevated myocardial infarction) (HCC) (POA: Yes)  Active Problems:    Prediabetes (POA: Yes)    Abnormal EKG (POA: Yes)    Elevated brain natriuretic peptide (BNP) level (POA: Yes)    Arrhythmia possible atrial fibrillation (POA: Yes)    History of mitral valve repair (POA: Unknown)  Resolved Problems:    * No resolved hospital problems. *      Past Medical History:   Diagnosis Date    History of mitral valve repair        No past surgical history on file.      Medications Prior to Admission   Medication Sig Dispense Refill Last Dose     Niacinamide-Zn-Cu-Hjqoab-Dz-Yw (NICOTINAMIDE PO) Take 1 Tablet by mouth every day. Nicotinomide mononitrate -- natural supplement for Alzheimer's   1/25/2024 at am    Medium Chain Triglycerides (MCT) Oil Take 1 Each by mouth every day.   1/25/2024 at am     Current Facility-Administered Medications   Medication Dose Route Frequency Provider Last Rate Last Admin    acetaminophen (Tylenol) tablet 650 mg  650 mg Oral Q6HRS PRN David Nina M.D.   650 mg at 01/28/24 1403    senna-docusate (Pericolace Or Senokot S) 8.6-50 MG per tablet 2 Tablet  2 Tablet Oral BID David Nina M.D.        And    polyethylene glycol/lytes (Miralax) Packet 1 Packet  1 Packet Oral QDAY PRN David Nina M.D.        And    magnesium hydroxide (Milk Of Magnesia) suspension 30 mL  30 mL Oral QDAY PRN David Nina M.D.        And    bisacodyl (Dulcolax) suppository 10 mg  10 mg Rectal QDAY PRN David Nina M.D.        [START ON 1/29/2024] aspirin EC tablet 81 mg  81 mg Oral DAILY David Nina M.D.        Pharmacy Consult Request ...Pain Management Review 1 Each  1 Each Other PHARMACY TO DOSE David Nina M.D.        oxyCODONE immediate-release (Roxicodone) tablet 2.5 mg  2.5 mg Oral Q3HRS PRN David Nina M.D.        Or    oxyCODONE immediate-release (Roxicodone) tablet 5 mg  5 mg Oral Q3HRS PRN David Nina M.D.        Or    HYDROmorphone (Dilaudid) injection 0.25 mg  0.25 mg Intravenous Q3HRS PRN David Nina M.D.        ondansetron (Zofran) syringe/vial injection 4 mg  4 mg Intravenous Q4HRS PRN David Nina M.D.        ondansetron (Zofran ODT) dispertab 4 mg  4 mg Oral Q4HRS PRN David Nina M.D.        nitroglycerin (Nitrostat) tablet 0.4 mg  0.4 mg Sublingual Q5 MIN PRN David Nina M.D.        insulin regular (HumuLIN R,NovoLIN R) injection  1-6 Units Subcutaneous 4X/DAY ACHS David Nina M.D.        And    dextrose 50% (D50W) injection 25 g  25 g Intravenous Q15 MIN PRN David HE  NANCY Nina        heparin infusion 25,000 units in 500 mL 0.45% NACL  0-30 Units/kg/hr Intravenous Continuous David Nina M.D. 20.6 mL/hr at 01/28/24 1344 12 Units/kg/hr at 01/28/24 1344    heparin injection 2,000 Units  2,000 Units Intravenous PRN David Nina M.D.   2,000 Units at 01/28/24 1355    carvedilol (Coreg) tablet 3.125 mg  3.125 mg Oral BID WITH MEALS David Nina M.D.   3.125 mg at 01/28/24 1411    lisinopril (Prinivil) tablet 2.5 mg  2.5 mg Oral Q DAY David Nina M.D.   2.5 mg at 01/28/24 1410         Allergies   Allergen Reactions    Lipitor [Atorvastatin]      Severe muscle crumpling            No family history on file.      Social History     Socioeconomic History    Marital status: Single     Spouse name: Not on file    Number of children: Not on file    Years of education: Not on file    Highest education level: Not on file   Occupational History    Not on file   Tobacco Use    Smoking status: Not on file    Smokeless tobacco: Not on file   Substance and Sexual Activity    Alcohol use: Not on file    Drug use: Not on file    Sexual activity: Not on file   Other Topics Concern    Not on file   Social History Narrative    Not on file     Social Determinants of Health     Financial Resource Strain: Not on file   Food Insecurity: Not on file   Transportation Needs: Not on file   Physical Activity: Not on file   Stress: Not on file   Social Connections: Not on file   Intimate Partner Violence: Not on file   Housing Stability: Not on file       Intake/Output Summary (Last 24 hours) at 1/28/2024 1706  Last data filed at 1/28/2024 1516  Gross per 24 hour   Intake 240 ml   Output 0 ml   Net 240 ml           Review of Systems   Constitutional: Negative for diaphoresis and fever.   Cardiovascular:  Positive for chest pain. Negative for dyspnea on exertion, leg swelling, near-syncope, orthopnea, palpitations, paroxysmal nocturnal dyspnea and syncope.   Respiratory: Negative.  Negative  "for cough, hemoptysis and wheezing.    Gastrointestinal:  Negative for hematochezia and melena.   Genitourinary:  Negative for hematuria.        Physical Exam  Body mass index is 28 kg/m².  BP (!) 157/99   Pulse 79   Temp 36.6 °C (97.9 °F) (Temporal)   Resp 14   Ht 1.753 m (5' 9\")   Wt 86 kg (189 lb 9.5 oz)   SpO2 93%   Vitals:    01/28/24 1159 01/28/24 1200 01/28/24 1410 01/28/24 1516   BP: (!) 152/95  (!) 161/102 (!) 157/99   Pulse: 80  78 79   Resp: 14   14   Temp: 36.3 °C (97.3 °F)   36.6 °C (97.9 °F)   TempSrc: Temporal   Temporal   SpO2: 94%   93%   Weight:       Height:  1.753 m (5' 9\")       Oxygen Therapy:  Pulse Oximetry: 93 %, O2 (LPM): 0, O2 Delivery Device: None - Room Air    Physical Exam  Constitutional:       Appearance: Normal appearance.   Eyes:      Extraocular Movements: Extraocular movements intact.      Conjunctiva/sclera: Conjunctivae normal.   Neck:      Vascular: No carotid bruit or JVD.   Cardiovascular:      Rate and Rhythm: Normal rate and regular rhythm.      Pulses:           Radial pulses are 1+ on the right side and 1+ on the left side.        Posterior tibial pulses are 1+ on the right side and 1+ on the left side.      Heart sounds: No murmur heard.     No friction rub. Gallop present. S4 sounds present.   Pulmonary:      Effort: Pulmonary effort is normal. No respiratory distress.      Breath sounds: Normal breath sounds. No wheezing or rales.   Abdominal:      General: Bowel sounds are normal.      Palpations: Abdomen is soft.   Musculoskeletal:      Cervical back: Neck supple.      Right lower leg: No edema.      Left lower leg: No edema.   Skin:     General: Skin is warm and dry.   Neurological:      Mental Status: He is alert and oriented to person, place, and time. Mental status is at baseline.   Psychiatric:         Mood and Affect: Mood normal.        Labs (personally reviewed and notable for):   Lab Results   Component Value Date/Time    SODIUM 137 01/28/2024 11:10 AM " "   POTASSIUM 3.9 2024 11:10 AM    CHLORIDE 104 2024 11:10 AM    CO2 23 2024 11:10 AM    GLUCOSE 128 (H) 2024 11:10 AM    BUN 14 2024 11:10 AM    CREATININE 0.75 2024 11:10 AM      Lab Results   Component Value Date/Time    WBC 12.1 (H) 2024 11:10 AM    RBC 5.27 2024 11:10 AM    HEMOGLOBIN 15.9 2024 11:10 AM    HEMATOCRIT 46.9 2024 11:10 AM    MCV 89.0 2024 11:10 AM    MCH 30.2 2024 11:10 AM    MCHC 33.9 2024 11:10 AM    MPV 10.1 2024 11:10 AM    NEUTSPOLYS 85.50 (H) 2024 11:10 AM    LYMPHOCYTES 6.70 (L) 2024 11:10 AM    MONOCYTES 6.60 2024 11:10 AM    EOSINOPHILS 0.40 2024 11:10 AM    BASOPHILS 0.50 2024 11:10 AM    INR 1.00 2024 11:10 AM      No results found for: \"CHOLSTRLTOT\", \"LDL\", \"HDL\", \"TRIGLYCERIDE\"    Lab Results   Component Value Date/Time    TROPONINT 62 (H) 2024 1428     Lab Results   Component Value Date/Time    NTPROBNP 614 (H) 2024 1110     Lab Results   Component Value Date/Time    HBA1C 5.9 (H) 2024 1110       Cardiac Imaging and Procedures Review:    EKG dated 24: My personal interpretation is sinus rhythm, T wave inversions in the anterolateral leads cannot rule out ischemia, no prior ECG for comparison    Radiology test Review:  EC-ECHOCARDIOGRAM COMPLETE W/O CONT    (Results Pending)     Impression and Medical Decision Makin.  Non-ST elevation MI presenting with chest/jaw pain.  Initial troponin is 59, then 62.  ECG does have some T wave inversions but nothing acute.  Discussed with him recommendation for proceeding with cardiac catheterization with possible percutaneous coronary mention.The risks, benefits, and alternatives to coronary angiography with possible percutaneous coronary intervention and IV sedation were discussed in great detail. Specific risks mentioned include bleeding that may require blood transfusion, kidney damage from IV " contrast allergy, infection, allergic reaction, arterial damage that may require intervention or surgery, cardiac perforation with possible tamponade requiring kulwinder-cardiocentesis or possible open heart surgery. We also discussed in great detail a stent is placed, they will have to be on two antiplatelet agents (blood thinners) for up to 1 year.  Verified with patient no planned surgeries/procedures are planned within the next year.  Verified with patient no recent bleeding.  In addition, we discussed that 10% of patients will experience small to moderate bruising at the side of the arterial puncture. Lastly the risks of heart attack, stroke, and death were discussed; the risks of major complications such as heart attack or stroke caused by the angiogram is less than 1%; the risk of death is approximately 1 in 1000. The patient verbalized understanding of these potential complications and wishes to proceed with this procedure.  - N.p.o. after midnight for cardiac catheterization  - Enteric-coated aspirin 81 mg p.o. daily  - Agree with starting carvedilol 3.125 mg p.o. twice daily, titrate for heart rate and blood pressure  - Okay to continue with starting lisinopril 2.5 mg p.o. daily titrate for blood pressure  - Patient reports significant muscle spasm with Lipitor, would consider trying a different 1 such as rosuvastatin 5 mg p.o. nightly, can start it tomorrow after cath if find significant coronary disease  - Check echocardiogram    2.  History of mitral valve repair.  Appears to be functioning normally with normal cardiac exam.    Thank you for allowing me to participate in the care of this patient, cardiology will continue to follow.  Please contact me with any questions.    Fariha Carranza M.D.  Cardiologist, [facility] [place]     This note was generated using voice recognition software which has a small chance of producing errors of grammar and possibly content. I have made every reasonable  attempt to find and correct any obvious errors, but expect that some may not be found prior to finalization of this note.

## 2024-01-29 NOTE — PROGRESS NOTES
4 Eyes Skin Assessment Completed by PARKER Oreilly and Aleisha RN.    Head WDL  Ears WDL  Nose WDL  Mouth WDL  Neck WDL  Breast/Chest Scar  Shoulder Blades WDL  Spine WDL  (R) Arm/Elbow/Hand WDL  (L) Arm/Elbow/Hand WDL  Abdomen WDL  Groin WDL  Scrotum/Coccyx/Buttocks WDL  (R) Leg WDL  (L) Leg WDL  (R) Heel/Foot/Toe WDL  (L) Heel/Foot/Toe WDL          Devices In Places Tele Box, Blood Pressure Cuff, and Pulse Ox      Interventions In Place Pressure Redistribution Mattress    Possible Skin Injury No    Pictures Uploaded Into Epic No, needs to be completed  Wound Consult Placed N/A  RN Wound Prevention Protocol Ordered No

## 2024-01-29 NOTE — PROGRESS NOTES
Lab called with critical result of positive test for C diff  at 1355. Critical lab result read back to lab.   Dr. Barragan notified of critical lab result at 1400.  Critical lab result read back by Dr. Barragan.

## 2024-01-29 NOTE — PROGRESS NOTES
Monitor Summary  Rhythm: SR/ST  Rate:   Ectopy: PVC's, PAC's, couplet  Measurements: 0.17/0.09/0.43  ---12 hr Chart Review---

## 2024-01-29 NOTE — PROGRESS NOTES
Monitor summary:        Rhythm: SR   Rate: 76-82  Ectopy: (o)PVC  Measurements: 16./.12/.34        12hr chart check

## 2024-01-29 NOTE — PROGRESS NOTES
Cardiology Follow Up Progress Note    Date of Service  1/29/2024    Attending Physician  Kierra Barragan M.D.    Chief Complaint   Chest pain    HPI  Osman Guzmán is a 68 y.o. male with a history of mitral valve repair and post op afib admitted 1/28/2024 with NSTEMI.     Interim Events  1/29/2024: No cardiac events overnight. Sinus rhythm on telemetry. Vital signs stable. Her reports he is doing well other than significant diarrhea. No chest pain or palpitations. No shortness of breath, dyspnea on exertion, orthopnea or PND. No lower extremity edema. No dizziness or lightheadedness. No syncope or presyncope.      Review of Systems  Review of Systems   Constitutional:  Negative for chills, diaphoresis, fatigue and fever.   HENT: Negative.     Eyes: Negative.    Respiratory:  Negative for cough, chest tightness and shortness of breath.    Cardiovascular:  Negative for chest pain, palpitations and leg swelling.   Gastrointestinal:  Positive for diarrhea. Negative for abdominal distention, abdominal pain, constipation, nausea and vomiting.   Endocrine: Negative.    Genitourinary:  Negative for decreased urine volume, difficulty urinating, dysuria, frequency and urgency.   Musculoskeletal:  Negative for arthralgias and myalgias.   Skin:  Negative for color change.   Neurological:  Negative for dizziness, syncope and light-headedness.   Hematological:  Does not bruise/bleed easily.   Psychiatric/Behavioral: Negative.         Vital signs in last 24 hours  Temp:  [36.3 °C (97.3 °F)-36.9 °C (98.4 °F)] 36.7 °C (98 °F)  Pulse:  [76-92] 84  Resp:  [14-18] 18  BP: (136-161)/() 137/85  SpO2:  [93 %-96 %] 95 %    Physical Exam  Physical Exam  Vitals and nursing note reviewed.   Constitutional:       General: He is not in acute distress.     Appearance: Normal appearance. He is not toxic-appearing.   HENT:      Head: Normocephalic and atraumatic.      Right Ear: External ear normal.      Left Ear: External ear normal.       Nose: Nose normal.      Mouth/Throat:      Mouth: Mucous membranes are moist.      Pharynx: Oropharynx is clear.   Eyes:      General: No scleral icterus.     Extraocular Movements: Extraocular movements intact.      Conjunctiva/sclera: Conjunctivae normal.      Pupils: Pupils are equal, round, and reactive to light.   Neck:      Vascular: No JVD.   Cardiovascular:      Rate and Rhythm: Normal rate and regular rhythm.      Pulses: Normal pulses.      Heart sounds: Normal heart sounds. No murmur heard.     No friction rub. No gallop.   Pulmonary:      Effort: Pulmonary effort is normal.      Breath sounds: Normal breath sounds.   Abdominal:      General: Abdomen is flat. Bowel sounds are normal. There is no distension.      Palpations: Abdomen is soft.      Tenderness: There is no abdominal tenderness.   Musculoskeletal:         General: Normal range of motion.      Cervical back: Normal range of motion and neck supple.      Right lower leg: No edema.      Left lower leg: No edema.   Skin:     General: Skin is warm and dry.      Capillary Refill: Capillary refill takes less than 2 seconds.      Coloration: Skin is not jaundiced.   Neurological:      General: No focal deficit present.      Mental Status: He is alert and oriented to person, place, and time. Mental status is at baseline.   Psychiatric:         Mood and Affect: Mood normal.         Behavior: Behavior normal.         Judgment: Judgment normal.         Lab Review  Lab Results   Component Value Date/Time    WBC 12.0 (H) 01/29/2024 04:19 AM    RBC 5.13 01/29/2024 04:19 AM    HEMOGLOBIN 15.7 01/29/2024 04:19 AM    HEMATOCRIT 45.4 01/29/2024 04:19 AM    MCV 88.5 01/29/2024 04:19 AM    MCH 30.6 01/29/2024 04:19 AM    MCHC 34.6 01/29/2024 04:19 AM    MPV 11.0 01/29/2024 04:19 AM      Lab Results   Component Value Date/Time    SODIUM 136 01/29/2024 04:19 AM    POTASSIUM 4.0 01/29/2024 04:19 AM    CHLORIDE 103 01/29/2024 04:19 AM    CO2 23 01/29/2024 04:19 AM     GLUCOSE 131 (H) 01/29/2024 04:19 AM    BUN 12 01/29/2024 04:19 AM    CREATININE 0.79 01/29/2024 04:19 AM      Lab Results   Component Value Date/Time    ASTSGOT 24 01/29/2024 04:19 AM    ALTSGPT 38 01/29/2024 04:19 AM     Lab Results   Component Value Date/Time    CHOLSTRLTOT 240 (H) 01/29/2024 04:19 AM     (H) 01/29/2024 04:19 AM    HDL 73 01/29/2024 04:19 AM    TRIGLYCERIDE 120 01/29/2024 04:19 AM    TROPONINT 62 (H) 01/28/2024 02:28 PM       Recent Labs     01/28/24  1110   NTPROBNP 614*       Cardiac Imaging and Procedures Review  EKG 1/29/2024  Sinus rhythm with t wave inversion anterolateral leads    Echocardiogram:  Pending    Cardiac Catheterization:  Pending    Assessment/Plan  #NSTEMI  # Dyslipidemia intolerant to lipitor  #History of mitral valve repair  #Cdiff rule out    Recommendations:  - No current cardiac symptoms. Chest pain has improved after heparin gtt.  -Now with severe diarrhea every 15 minutes with cdiff rule out.   -Previously intolerant to Lipitor. Is willing to try Crestor. If he develops myalgias, consider dosing as 3x weekly.   - Continue carvedilol 3.125mg BID and lisinopril 2.5mg daily.  - Continue heparin gtt and aspirin.   - Echo is pending  -Meds-to-beds on discharge  -Discussed CV risk factor modification including cholesterol management, blood pressure management, smoking cessation, diet and lifestyle changes.   - Keep NPO. Plan for LHC later today depending on cdiff rule out.       Addendum:  Troponin remains flat and he is not having any cardiac symptoms. It does not appear he will be able to proceed with the profusive diarrhea. We will plan for LHC tomorrow.     Cardiology will continue to follow.    Please contact me with any questions.    Thank you for allowing me to participate in the care of Osman Guzmán .    Jaja Spence PA-C, Cardiology  [facility], [facility]  [address], [address]  [address]  Phone: [phone]  Fax: [phone]    PLEASE NOTE: This note was created using voice recognition software. I have made every reasonable attempt to correct obvious errors, but I expect that there are errors of grammar and possibly content that I did not discover before finalizing the note.     I personally spent a total of 18 minutes which includes face-to-face time and non-face-to-face time spent on preparing to see the patient, reviewing hospital notes and tests, obtaining history from the patient, performing a medically appropriate exam, counseling and educating the patient, ordering medications/tests/procedures/referrals as clinically indicated, and documenting information in the electronic medical record.

## 2024-01-29 NOTE — ASSESSMENT & PLAN NOTE
Echo showed normal ejection fraction with right ventricular systolic pressure  CTA didn't show pe  -- Patient underwent right heart cath, found to have precapillary pulmonary hypertension. Need to follow-up with patient as an outpatient

## 2024-01-29 NOTE — CARE PLAN
The patient is Stable - Low risk of patient condition declining or worsening    Shift Goals  Clinical Goals: monitor labs, vitals, heparin gtt  Patient Goals: rest, comfort    Progress made toward(s) clinical / shift goals:    Problem: Knowledge Deficit - Standard  Goal: Patient and family/care givers will demonstrate understanding of plan of care, disease process/condition, diagnostic tests and medications  Outcome: Progressing     Problem: Psychosocial  Goal: Patient's level of anxiety will decrease  Outcome: Progressing     Problem: Hemodynamics  Goal: Patient's hemodynamics, fluid balance and neurologic status will be stable or improve  Outcome: Progressing     Problem: Respiratory  Goal: Patient will achieve/maintain optimum respiratory ventilation and gas exchange  Outcome: Progressing       Patient is not progressing towards the following goals:

## 2024-01-29 NOTE — PROGRESS NOTES
Bedside report received at 1900 and assumed care of patient. Resting in bed,  A&O x4  complaining of gas pain. Placed order for meds. Tele monitoring in place. Educated on fall risk, all fall precautions in place. Call light within reach, bed locked and in lowest position, denied other needs at this time. Hourly rounding in place.

## 2024-01-29 NOTE — PROGRESS NOTES
Hospital Medicine Daily Progress Note    Date of Service  1/29/2024    Chief Complaint  Osman Guzmán is a 68 y.o. male admitted 1/28/2024 with chest pain    Hospital Course  68-year-old male with history of open heart surgery for valve replacement more than 10 years, presented 1/28 with chest pain.  Initial evaluation at outside facility with chest pain, pressure chest pain, not related to exertion, EKG showed T inversion on lateral leads with mildly elevated troponin, patient was transferred to our facility for higher level of care and cardiology consult.  Heparin drip with aspirin and rosuvastatin were started also beta-blockers and lisinopril.  Patient was evaluated by cardiology team and planning for cardiac cath.    Patient started having diarrhea, watery diarrhea, no significant fever or abdominal pain, lactoferrin is positive and patient had mildly leukocytosis, C. difficile test was positive, oral vancomycin were started.      Interval Problem Update  -Evaluated examined the patient at bedside, denied any chest pain however patient has diarrhea, watery, green stool, lactoferrin is positive, C. difficile positive, mild leukocytosis, started with oral vancomycin.  -Continue heparin drip with aspirin, start with rosuvastatin and close monitoring.  -Echo showed LVEF spike and RVSP is 50 with pulmonary hypertension, will order CTA which did not show any signs of PE or abnormalities except atherosclerosis.  -Telemetry, EKG reviewed, sinus rhythm T wave version, no significant changes than previous EKG, waiting for cardiac cath tomorrow.  -Case was discussed in detail with the patient, answered all his question, discussed the plan of care, he understood and agreed.      I have discussed this patient's plan of care and discharge plan at IDT rounds today with Case Management, Nursing, Nursing leadership, and other members of the IDT team.    Consultants/Specialty  cardiology    Code Status  Full  Code    Disposition  The patient is not medically cleared for discharge to home or a post-acute facility.  Anticipate discharge to: home with close outpatient follow-up    I have placed the appropriate orders for post-discharge needs.    Review of Systems  Review of Systems   Constitutional:  Negative for chills, fever and weight loss.   HENT:  Negative for ear pain, hearing loss and tinnitus.    Eyes:  Negative for blurred vision, double vision and photophobia.   Respiratory:  Negative for cough and hemoptysis.    Cardiovascular:  Negative for chest pain, palpitations, orthopnea and claudication.   Gastrointestinal:  Positive for abdominal pain and diarrhea. Negative for constipation, nausea and vomiting.   Genitourinary:  Negative for dysuria, frequency and urgency.   Musculoskeletal:  Negative for myalgias and neck pain.   Skin:  Negative for rash.   Neurological:  Negative for dizziness, speech change and weakness.        Physical Exam  Temp:  [36.5 °C (97.7 °F)-36.9 °C (98.4 °F)] 36.8 °C (98.2 °F)  Pulse:  [75-92] 75  Resp:  [16-18] 16  BP: (110-139)/(71-92) 128/73  SpO2:  [93 %-96 %] 93 %    Physical Exam  Constitutional:       Appearance: He is not ill-appearing.   Eyes:      General: No scleral icterus.  Cardiovascular:      Rate and Rhythm: Normal rate.      Heart sounds: No murmur heard.  Pulmonary:      Effort: No respiratory distress.      Breath sounds: No wheezing.   Abdominal:      General: There is no distension.      Tenderness: There is no abdominal tenderness. There is no right CVA tenderness, left CVA tenderness or guarding.   Musculoskeletal:      Right lower leg: No edema.      Left lower leg: No edema.   Lymphadenopathy:      Cervical: No cervical adenopathy.   Skin:     Coloration: Skin is not jaundiced.      Findings: No bruising, lesion or rash.   Neurological:      General: No focal deficit present.      Mental Status: He is alert and oriented to person, place, and time. Mental status is  at baseline.      Cranial Nerves: No cranial nerve deficit.      Motor: No weakness.      Gait: Gait normal.         Fluids    Intake/Output Summary (Last 24 hours) at 1/29/2024 1822  Last data filed at 1/29/2024 0423  Gross per 24 hour   Intake 50 ml   Output 0 ml   Net 50 ml       Laboratory  Recent Labs     01/28/24  1110 01/29/24  0419   WBC 12.1* 12.0*   RBC 5.27 5.13   HEMOGLOBIN 15.9 15.7   HEMATOCRIT 46.9 45.4   MCV 89.0 88.5   MCH 30.2 30.6   MCHC 33.9 34.6   RDW 48.3 48.6   PLATELETCT 219 194   MPV 10.1 11.0     Recent Labs     01/28/24  1110 01/29/24  0419   SODIUM 137 136   POTASSIUM 3.9 4.0   CHLORIDE 104 103   CO2 23 23   GLUCOSE 128* 131*   BUN 14 12   CREATININE 0.75 0.79   CALCIUM 9.3 9.1     Recent Labs     01/28/24  1110   APTT 35.7   INR 1.00         Recent Labs     01/29/24  0419   TRIGLYCERIDE 120   HDL 73   *       Imaging  CT-CTA CHEST PULMONARY ARTERY W/ RECONS   Final Result      1.  No CT evidence of pulmonary embolism.      2.  There is calcific atherosclerosis including the coronary arteries.            EC-ECHOCARDIOGRAM COMPLETE W/O CONT   Final Result      CL-LEFT HEART CATHETERIZATION WITH POSSIBLE INTERVENTION    (Results Pending)        Assessment/Plan  * NSTEMI (non-ST elevated myocardial infarction) (HCC)- (present on admission)  Assessment & Plan    Pressure chest pain with this changes on EKG and mildly elevated troponin  Started with heparin drip, aspirin and rosuvastatin  Continue lisinopril 2.5 mg daily and Coreg  Echo is pending  Cardiology was consulted and planning for cardiac cath  Telemetry and close monitoring  Nitroglycerin as needed for chest pain    C. difficile colitis  Assessment & Plan  Patient developed diarrhea after admission in around 12 hours  Mild leukocytosis and no kidney failure  C. difficile test was positive  Vancomycin orally was started on 1/29    Pulmonary hypertension (HCC)  Assessment & Plan  Echo showed normal ejection fraction with right  ventricular systolic pressure  Will order CTA  Patient might need sleep study as outpatient    Diarrhea  Assessment & Plan  With mild leukocytosis  Mild abdominal pain  Watery, no black stool  Lactoferrin is positive  C. difficile is pending  Started on Flagyl and close monitoring    History of mitral valve repair  Assessment & Plan  More than 10 years ago  Echo is pending    Arrhythmia possible atrial fibrillation- (present on admission)  Assessment & Plan  No history of atrial fibrillation  EKG and telemetry shows sinus rhythm  Cardiology on board, continue monitoring in telemetry    Elevated brain natriuretic peptide (BNP) level- (present on admission)  Assessment & Plan  I will check an echocardiographic    Abnormal EKG- (present on admission)  Assessment & Plan  ST depression and T inversion on lateral leads   continue aspirin with heparin drip    Cardiac cath    Prediabetes- (present on admission)  Assessment & Plan  A1c 5.9  Encouraged the patient for healthy diet  Consider metformin on discharge         VTE prophylaxis:   SCDs/TEDs   therapeutic anticoagulation with weight-based heparin gtt, pharmacy to adjust PRN      I have performed a physical exam and reviewed and updated ROS and Plan today (1/29/2024). In review of yesterday's note (1/28/2024), there are no changes except as documented above.    Greater than 51 minutes spent prepping to see patient (e.g. review of tests) obtaining and/or reviewing separately obtained history. Performing a medically appropriate examination and/ evaluation.  Counseling and educating the patient/family/caregiver.  Ordering medications, tests, or procedures.  Referring and communicating with other health care professionals.  Documenting clinical information in EPIC.  Independently interpreting results and communicating results to patient/family/caregiver.  Care coordination

## 2024-01-29 NOTE — PROGRESS NOTES
Patient arrived to unit-Tele716-2 via medical transport.  Pt assessed, A&Ox4, , pt denies pain. no SOB Noted.   Pt updated on plan of care, Call light within reach, personal belongings within reach.  Bed in lowest position. Tele monitor on and monitor room notified, rhythm is SR 80. Will continue to monitor. Hourly rounding in place.

## 2024-01-29 NOTE — PROGRESS NOTES
Med rec completed per pt.  Denies any medication use in past 30 days. Takes natural supplements only.   Allergies reviewed with pt.   Home antibiotics in past 30 days: none   Anticoagulants/antiplatelets in past 14 days: none  Preferred pharmacy: [facility], Pharmacy Intern

## 2024-01-30 ENCOUNTER — APPOINTMENT (OUTPATIENT)
Dept: CARDIOLOGY | Facility: MEDICAL CENTER | Age: 69
DRG: 322 | End: 2024-01-30
Attending: INTERNAL MEDICINE
Payer: MEDICARE

## 2024-01-30 LAB
ALBUMIN SERPL BCP-MCNC: 3.5 G/DL (ref 3.2–4.9)
ALBUMIN/GLOB SERPL: 1.1 G/DL
ALP SERPL-CCNC: 40 U/L (ref 30–99)
ALT SERPL-CCNC: 27 U/L (ref 2–50)
ANION GAP SERPL CALC-SCNC: 11 MMOL/L (ref 7–16)
AST SERPL-CCNC: 17 U/L (ref 12–45)
BASOPHILS # BLD AUTO: 0.4 % (ref 0–1.8)
BASOPHILS # BLD: 0.04 K/UL (ref 0–0.12)
BILIRUB SERPL-MCNC: 0.4 MG/DL (ref 0.1–1.5)
BUN SERPL-MCNC: 13 MG/DL (ref 8–22)
CALCIUM ALBUM COR SERPL-MCNC: 9.5 MG/DL (ref 8.5–10.5)
CALCIUM SERPL-MCNC: 9.1 MG/DL (ref 8.5–10.5)
CHLORIDE SERPL-SCNC: 105 MMOL/L (ref 96–112)
CO2 SERPL-SCNC: 22 MMOL/L (ref 20–33)
CREAT SERPL-MCNC: 0.74 MG/DL (ref 0.5–1.4)
E COLI SXT1+2 STL IA: NORMAL
EOSINOPHIL # BLD AUTO: 0.17 K/UL (ref 0–0.51)
EOSINOPHIL NFR BLD: 1.9 % (ref 0–6.9)
ERYTHROCYTE [DISTWIDTH] IN BLOOD BY AUTOMATED COUNT: 50.2 FL (ref 35.9–50)
GFR SERPLBLD CREATININE-BSD FMLA CKD-EPI: 99 ML/MIN/1.73 M 2
GLOBULIN SER CALC-MCNC: 3.1 G/DL (ref 1.9–3.5)
GLUCOSE BLD STRIP.AUTO-MCNC: 155 MG/DL (ref 65–99)
GLUCOSE BLD STRIP.AUTO-MCNC: 94 MG/DL (ref 65–99)
GLUCOSE SERPL-MCNC: 109 MG/DL (ref 65–99)
HCT VFR BLD AUTO: 45.3 % (ref 42–52)
HGB BLD-MCNC: 15 G/DL (ref 14–18)
IMM GRANULOCYTES # BLD AUTO: 0.04 K/UL (ref 0–0.11)
IMM GRANULOCYTES NFR BLD AUTO: 0.4 % (ref 0–0.9)
LYMPHOCYTES # BLD AUTO: 1.22 K/UL (ref 1–4.8)
LYMPHOCYTES NFR BLD: 13.3 % (ref 22–41)
MAGNESIUM SERPL-MCNC: 1.7 MG/DL (ref 1.5–2.5)
MCH RBC QN AUTO: 30.1 PG (ref 27–33)
MCHC RBC AUTO-ENTMCNC: 33.1 G/DL (ref 32.3–36.5)
MCV RBC AUTO: 91 FL (ref 81.4–97.8)
MONOCYTES # BLD AUTO: 0.86 K/UL (ref 0–0.85)
MONOCYTES NFR BLD AUTO: 9.4 % (ref 0–13.4)
NEUTROPHILS # BLD AUTO: 6.83 K/UL (ref 1.82–7.42)
NEUTROPHILS NFR BLD: 74.6 % (ref 44–72)
NRBC # BLD AUTO: 0 K/UL
NRBC BLD-RTO: 0 /100 WBC (ref 0–0.2)
PHOSPHATE SERPL-MCNC: 3.6 MG/DL (ref 2.5–4.5)
PLATELET # BLD AUTO: 177 K/UL (ref 164–446)
PMV BLD AUTO: 11.4 FL (ref 9–12.9)
POTASSIUM SERPL-SCNC: 3.5 MMOL/L (ref 3.6–5.5)
PROT SERPL-MCNC: 6.6 G/DL (ref 6–8.2)
RBC # BLD AUTO: 4.98 M/UL (ref 4.7–6.1)
SIGNIFICANT IND 70042: NORMAL
SITE SITE: NORMAL
SODIUM SERPL-SCNC: 138 MMOL/L (ref 135–145)
SOURCE SOURCE: NORMAL
UFH PPP CHRO-ACNC: 0.37 IU/ML
WBC # BLD AUTO: 9.2 K/UL (ref 4.8–10.8)

## 2024-01-30 PROCEDURE — 700102 HCHG RX REV CODE 250 W/ 637 OVERRIDE(OP): Performed by: INTERNAL MEDICINE

## 2024-01-30 PROCEDURE — 80053 COMPREHEN METABOLIC PANEL: CPT

## 2024-01-30 PROCEDURE — 700111 HCHG RX REV CODE 636 W/ 250 OVERRIDE (IP): Performed by: INTERNAL MEDICINE

## 2024-01-30 PROCEDURE — 700102 HCHG RX REV CODE 250 W/ 637 OVERRIDE(OP): Performed by: HOSPITALIST

## 2024-01-30 PROCEDURE — A9270 NON-COVERED ITEM OR SERVICE: HCPCS | Performed by: PHYSICIAN ASSISTANT

## 2024-01-30 PROCEDURE — 700102 HCHG RX REV CODE 250 W/ 637 OVERRIDE(OP): Performed by: PHYSICIAN ASSISTANT

## 2024-01-30 PROCEDURE — 700102 HCHG RX REV CODE 250 W/ 637 OVERRIDE(OP): Mod: JZ | Performed by: HOSPITALIST

## 2024-01-30 PROCEDURE — 770020 HCHG ROOM/CARE - TELE (206)

## 2024-01-30 PROCEDURE — 85025 COMPLETE CBC W/AUTO DIFF WBC: CPT

## 2024-01-30 PROCEDURE — 85520 HEPARIN ASSAY: CPT

## 2024-01-30 PROCEDURE — 700111 HCHG RX REV CODE 636 W/ 250 OVERRIDE (IP): Performed by: HOSPITALIST

## 2024-01-30 PROCEDURE — A9270 NON-COVERED ITEM OR SERVICE: HCPCS | Mod: JZ | Performed by: HOSPITALIST

## 2024-01-30 PROCEDURE — A9270 NON-COVERED ITEM OR SERVICE: HCPCS | Performed by: HOSPITALIST

## 2024-01-30 PROCEDURE — 82962 GLUCOSE BLOOD TEST: CPT

## 2024-01-30 PROCEDURE — 84100 ASSAY OF PHOSPHORUS: CPT

## 2024-01-30 PROCEDURE — 700101 HCHG RX REV CODE 250: Performed by: INTERNAL MEDICINE

## 2024-01-30 PROCEDURE — 99233 SBSQ HOSP IP/OBS HIGH 50: CPT | Performed by: HOSPITALIST

## 2024-01-30 PROCEDURE — 36415 COLL VENOUS BLD VENIPUNCTURE: CPT

## 2024-01-30 PROCEDURE — A9270 NON-COVERED ITEM OR SERVICE: HCPCS | Performed by: INTERNAL MEDICINE

## 2024-01-30 PROCEDURE — 83735 ASSAY OF MAGNESIUM: CPT

## 2024-01-30 PROCEDURE — 700111 HCHG RX REV CODE 636 W/ 250 OVERRIDE (IP): Mod: JZ | Performed by: HOSPITALIST

## 2024-01-30 RX ORDER — HEPARIN SODIUM 200 [USP'U]/100ML
INJECTION, SOLUTION INTRAVENOUS
Status: COMPLETED
Start: 2024-01-30 | End: 2024-01-30

## 2024-01-30 RX ORDER — POTASSIUM CHLORIDE 20 MEQ/1
40 TABLET, EXTENDED RELEASE ORAL ONCE
Status: COMPLETED | OUTPATIENT
Start: 2024-01-30 | End: 2024-01-30

## 2024-01-30 RX ORDER — MAGNESIUM SULFATE HEPTAHYDRATE 40 MG/ML
2 INJECTION, SOLUTION INTRAVENOUS ONCE
Status: COMPLETED | OUTPATIENT
Start: 2024-01-30 | End: 2024-01-30

## 2024-01-30 RX ORDER — CARVEDILOL 6.25 MG/1
6.25 TABLET ORAL 2 TIMES DAILY WITH MEALS
Status: DISCONTINUED | OUTPATIENT
Start: 2024-01-30 | End: 2024-02-01

## 2024-01-30 RX ORDER — LISINOPRIL 5 MG/1
5 TABLET ORAL
Status: DISCONTINUED | OUTPATIENT
Start: 2024-01-31 | End: 2024-01-31

## 2024-01-30 RX ORDER — LIDOCAINE HYDROCHLORIDE 20 MG/ML
INJECTION, SOLUTION INFILTRATION; PERINEURAL
Status: COMPLETED
Start: 2024-01-30 | End: 2024-01-30

## 2024-01-30 RX ORDER — HEPARIN SODIUM 1000 [USP'U]/ML
INJECTION, SOLUTION INTRAVENOUS; SUBCUTANEOUS
Status: COMPLETED
Start: 2024-01-30 | End: 2024-01-30

## 2024-01-30 RX ORDER — VERAPAMIL HYDROCHLORIDE 2.5 MG/ML
INJECTION, SOLUTION INTRAVENOUS
Status: COMPLETED
Start: 2024-01-30 | End: 2024-01-30

## 2024-01-30 RX ADMIN — POTASSIUM CHLORIDE 40 MEQ: 1500 TABLET, EXTENDED RELEASE ORAL at 16:13

## 2024-01-30 RX ADMIN — MAGNESIUM SULFATE HEPTAHYDRATE 2 G: 2 INJECTION, SOLUTION INTRAVENOUS at 16:18

## 2024-01-30 RX ADMIN — HEPARIN SODIUM 14 UNITS/KG/HR: 5000 INJECTION, SOLUTION INTRAVENOUS at 22:44

## 2024-01-30 RX ADMIN — VANCOMYCIN HYDROCHLORIDE 125 MG: 5 INJECTION, POWDER, LYOPHILIZED, FOR SOLUTION INTRAVENOUS at 13:29

## 2024-01-30 RX ADMIN — VANCOMYCIN HYDROCHLORIDE 125 MG: 5 INJECTION, POWDER, LYOPHILIZED, FOR SOLUTION INTRAVENOUS at 16:13

## 2024-01-30 RX ADMIN — ROSUVASTATIN 10 MG: 10 TABLET, FILM COATED ORAL at 16:13

## 2024-01-30 RX ADMIN — ASPIRIN 81 MG: 81 TABLET, COATED ORAL at 05:43

## 2024-01-30 RX ADMIN — OXYCODONE 5 MG: 5 TABLET ORAL at 18:26

## 2024-01-30 RX ADMIN — VANCOMYCIN HYDROCHLORIDE 125 MG: 5 INJECTION, POWDER, LYOPHILIZED, FOR SOLUTION INTRAVENOUS at 05:43

## 2024-01-30 RX ADMIN — OXYCODONE 5 MG: 5 TABLET ORAL at 21:38

## 2024-01-30 RX ADMIN — LISINOPRIL 2.5 MG: 5 TABLET ORAL at 05:43

## 2024-01-30 RX ADMIN — CARVEDILOL 3.12 MG: 3.12 TABLET, FILM COATED ORAL at 07:49

## 2024-01-30 RX ADMIN — OXYCODONE 5 MG: 5 TABLET ORAL at 03:00

## 2024-01-30 RX ADMIN — CARVEDILOL 6.25 MG: 6.25 TABLET, FILM COATED ORAL at 18:26

## 2024-01-30 RX ADMIN — HEPARIN SODIUM 14 UNITS/KG/HR: 5000 INJECTION, SOLUTION INTRAVENOUS at 05:16

## 2024-01-30 ASSESSMENT — ENCOUNTER SYMPTOMS
BRUISES/BLEEDS EASILY: 0
BLURRED VISION: 0
PSYCHIATRIC NEGATIVE: 1
ABDOMINAL DISTENTION: 0
NAUSEA: 0
SHORTNESS OF BREATH: 0
ABDOMINAL PAIN: 1
PHOTOPHOBIA: 0
NERVOUS/ANXIOUS: 1
ABDOMINAL PAIN: 0
FATIGUE: 0
WEIGHT LOSS: 0
DOUBLE VISION: 0
LIGHT-HEADEDNESS: 0
ARTHRALGIAS: 0
DIARRHEA: 1
CHILLS: 0
DIZZINESS: 0
EYES NEGATIVE: 1
COLOR CHANGE: 0
ORTHOPNEA: 0
MYALGIAS: 0
COUGH: 0
SPEECH CHANGE: 0
WEAKNESS: 0
DIAPHORESIS: 0
FEVER: 0
CHEST TIGHTNESS: 0
PALPITATIONS: 0
NECK PAIN: 0
CONSTIPATION: 0
VOMITING: 0
CLAUDICATION: 0
ENDOCRINE NEGATIVE: 1
HEMOPTYSIS: 0

## 2024-01-30 ASSESSMENT — PAIN DESCRIPTION - PAIN TYPE: TYPE: ACUTE PAIN

## 2024-01-30 ASSESSMENT — FIBROSIS 4 INDEX: FIB4 SCORE: 1.24

## 2024-01-30 NOTE — CARE PLAN
The patient is Stable - Low risk of patient condition declining or worsening    Shift Goals  Clinical Goals: monitor labs, VS, heparin gtt  Patient Goals: rest, comfort    Progress made toward(s) clinical / shift goals:    Problem: Psychosocial  Goal: Patient's level of anxiety will decrease  Outcome: Not Progressing       Patient is not progressing towards the following goals:      Problem: Psychosocial  Goal: Patient's level of anxiety will decrease  Outcome: Not Progressing

## 2024-01-30 NOTE — PROGRESS NOTES
Hospital Medicine Daily Progress Note    Date of Service  1/30/2024    Chief Complaint  Osman Guzmán is a 68 y.o. male admitted 1/28/2024 with chest pain    Hospital Course    This is a 68-year-old male with a past medical history significant for placement 10 years ago presented on 1/28 with a complaint of chest pain/chest pressure.  EKG showed T wave inversion in lateral leads with mild elevated troponin thus was transferred to [facility] for cardiology consultation.  Prior to transfer, patient was started on ASA, heparin drip, statin, BB, lisinopril.    Cardiology consulted, plan for cardiac cath today, currently n.p.o. at midnight.  His hospital course was complicated  c-dif colitis, will continue oral vancomycin    Interval events:  -- No acute events overnight, medicine administered, patient saturating well on room air.  Patient denies any chest pain abdominal evaluation  --Current continue to follow, currently patient is on heparin drip.  Will monitor antigen every 6 hours and titrate heparin drip accordingly.  --Patient 3.5 replete, magnesium 1.7 replete  --Continue oral vancomycin      I have discussed this patient's plan of care and discharge plan at IDT rounds today with Case Management, Nursing, Nursing leadership, and other members of the IDT team.    Consultants/Specialty  cardiology    Code Status  Full Code    Disposition  The patient is not medically cleared for discharge to home or a post-acute facility.  Anticipate discharge to: home with close outpatient follow-up    I have placed the appropriate orders for post-discharge needs.    Review of Systems  Review of Systems   Constitutional:  Negative for weight loss.   HENT:  Negative for congestion and hearing loss.    Eyes:  Negative for blurred vision, double vision and photophobia.   Respiratory:  Negative for cough and hemoptysis.    Cardiovascular:  Negative for chest pain, orthopnea and claudication.   Gastrointestinal:  Positive for abdominal pain and  diarrhea. Negative for constipation, nausea and vomiting.   Genitourinary:  Negative for dysuria, frequency and urgency.   Musculoskeletal:  Negative for myalgias and neck pain.   Neurological:  Negative for dizziness, speech change and weakness.   Psychiatric/Behavioral:  The patient is nervous/anxious.    All other systems reviewed and are negative.       Physical Exam  Temp:  [36.1 °C (97 °F)-37.1 °C (98.8 °F)] 36.1 °C (97 °F)  Pulse:  [64-78] 69  Resp:  [16-18] 18  BP: (128-147)/(73-84) 140/77  SpO2:  [93 %-96 %] 95 %    Physical Exam  Vitals and nursing note reviewed.   Constitutional:       Appearance: Normal appearance. He is not ill-appearing.   HENT:      Head: Normocephalic and atraumatic.   Eyes:      Extraocular Movements: Extraocular movements intact.      Pupils: Pupils are equal, round, and reactive to light.   Cardiovascular:      Rate and Rhythm: Normal rate.   Pulmonary:      Effort: No respiratory distress.      Breath sounds: No wheezing.   Abdominal:      General: There is no distension.      Tenderness: There is no abdominal tenderness.   Musculoskeletal:      Right lower leg: No edema.      Left lower leg: No edema.   Lymphadenopathy:      Cervical: No cervical adenopathy.   Skin:     Coloration: Skin is not jaundiced.   Neurological:      Mental Status: He is alert and oriented to person, place, and time. Mental status is at baseline.      Cranial Nerves: No cranial nerve deficit.      Motor: No weakness.      Gait: Gait normal.   Psychiatric:         Mood and Affect: Mood normal.         Fluids  No intake or output data in the 24 hours ending 01/30/24 1529      Laboratory  Recent Labs     01/28/24  1110 01/29/24  0419 01/30/24  0306   WBC 12.1* 12.0* 9.2   RBC 5.27 5.13 4.98   HEMOGLOBIN 15.9 15.7 15.0   HEMATOCRIT 46.9 45.4 45.3   MCV 89.0 88.5 91.0   MCH 30.2 30.6 30.1   MCHC 33.9 34.6 33.1   RDW 48.3 48.6 50.2*   PLATELETCT 219 194 177   MPV 10.1 11.0 11.4       Recent Labs      01/28/24  1110 01/29/24  0419 01/30/24  0306   SODIUM 137 136 138   POTASSIUM 3.9 4.0 3.5*   CHLORIDE 104 103 105   CO2 23 23 22   GLUCOSE 128* 131* 109*   BUN 14 12 13   CREATININE 0.75 0.79 0.74   CALCIUM 9.3 9.1 9.1       Recent Labs     01/28/24  1110   APTT 35.7   INR 1.00           Recent Labs     01/29/24  0419   TRIGLYCERIDE 120   HDL 73   *         Imaging  CT-CTA CHEST PULMONARY ARTERY W/ RECONS   Final Result      1.  No CT evidence of pulmonary embolism.      2.  There is calcific atherosclerosis including the coronary arteries.            EC-ECHOCARDIOGRAM COMPLETE W/O CONT   Final Result      CL-LEFT HEART CATHETERIZATION WITH POSSIBLE INTERVENTION    (Results Pending)        Assessment/Plan  * NSTEMI (non-ST elevated myocardial infarction) (HCC)- (present on admission)  Assessment & Plan    Pressure chest pain with this changes on EKG and mildly elevated troponin  Started with heparin drip, aspirin and rosuvastatin, acei and bb    -- echo reviewed, cards called and planning for cardiac cath    --npo now, will be going for cardiac cath    C. difficile colitis  Assessment & Plan  Patient developed diarrhea after admission in around 12 hours  Mild leukocytosis and no kidney failure  C. difficile test was positive  Vancomycin orally was started on 1/29; will continue     Pulmonary hypertension (HCC)  Assessment & Plan  Echo showed normal ejection fraction with right ventricular systolic pressure  CTA didn't show pe    Diarrhea  Assessment & Plan  C-dif +ve   Continue oral vanco    -- monitor BM    History of mitral valve repair  Assessment & Plan  More than 10 years ago  Echo reviewd    Arrhythmia possible atrial fibrillation- (present on admission)  Assessment & Plan  No history of atrial fibrillation  EKG and telemetry shows sinus rhythm  On heparin gtt    -- cards following     Elevated brain natriuretic peptide (BNP) level- (present on admission)  Assessment & Plan  Echo reviewed and noted to  have moderate pulm htn    Abnormal EKG- (present on admission)  Assessment & Plan  ST depression and T inversion on lateral leads   continue aspirin with heparin drip    Cardiac cath    Prediabetes- (present on admission)  Assessment & Plan  A1c 5.9  Encouraged the patient for healthy diet  Consider metformin on discharge         VTE prophylaxis: heparin gtt

## 2024-01-30 NOTE — PROGRESS NOTES
Telemetry Shift Summary     Rhythm: SR  HR: 64-77  Ectopy: PVC, PVC COUP, PCV TRIGEM    Measurements: .18/.09/.38              Normal Values  Rhythm: SR  HR:   Measurements: 0.12-0.20/0.08-0.10/0.30-0.52

## 2024-01-30 NOTE — PROGRESS NOTES
Monitor summary:        Rhythm: SR   Rate: 78-90  Ectopy: (O)PVC, (r)COUP  Measurements: 18./.09/.34        12hr chart check

## 2024-01-30 NOTE — ASSESSMENT & PLAN NOTE
Patient developed diarrhea after admission in around 12 hours  Mild leukocytosis and no kidney failure  C. difficile test was positive  Vancomycin orally was started on 1/29; will continue for a total of 10 days

## 2024-01-30 NOTE — PROGRESS NOTES
Cardiology Follow Up Progress Note    Date of Service  1/30/2024    Attending Physician  Kierra Barragan M.D.    Chief Complaint   Chest pain    HPI  Osman Guzmán is a 68 y.o. male with a history of mitral valve repair and post op afib admitted 1/28/2024 with NSTEMI.     Interim Events  1/30/2024: He tested positive for Cdiff and cath was delayed to today. Sinus rhythm on telemetry. Vital signs stable with mild hypertension. He reports he is feeling mildly better. He does continue to have diarrhea, but it is minimal output. No chest pain or palpitations. No shortness of breath, dyspnea on exertion, orthopnea or PND. No lower extremity edema. No dizziness or lightheadedness. No syncope or presyncope.      1/29/2024: No cardiac events overnight. Sinus rhythm on telemetry. Vital signs stable. Her reports he is doing well other than significant diarrhea. No chest pain or palpitations. No shortness of breath, dyspnea on exertion, orthopnea or PND. No lower extremity edema. No dizziness or lightheadedness. No syncope or presyncope.        Review of Systems  Review of Systems   Constitutional:  Negative for chills, diaphoresis, fatigue and fever.   HENT: Negative.     Eyes: Negative.    Respiratory:  Negative for cough, chest tightness and shortness of breath.    Cardiovascular:  Negative for chest pain, palpitations and leg swelling.   Gastrointestinal:  Positive for diarrhea. Negative for abdominal distention, abdominal pain, constipation, nausea and vomiting.   Endocrine: Negative.    Genitourinary:  Negative for decreased urine volume, difficulty urinating, dysuria, frequency and urgency.   Musculoskeletal:  Negative for arthralgias and myalgias.   Skin:  Negative for color change.   Neurological:  Negative for dizziness, syncope and light-headedness.   Hematological:  Does not bruise/bleed easily.   Psychiatric/Behavioral: Negative.         Vital signs in last 24 hours  Temp:  [36.7 °C (98 °F)-37.1 °C (98.8 °F)]  36.7 °C (98 °F)  Pulse:  [64-78] 64  Resp:  [16-18] 18  BP: (110-147)/(71-84) 142/79  SpO2:  [93 %-96 %] 96 %    Physical Exam  Physical Exam  Vitals and nursing note reviewed.   Constitutional:       General: He is not in acute distress.     Appearance: Normal appearance. He is not toxic-appearing.   HENT:      Head: Normocephalic and atraumatic.      Right Ear: External ear normal.      Left Ear: External ear normal.      Nose: Nose normal.      Mouth/Throat:      Mouth: Mucous membranes are moist.      Pharynx: Oropharynx is clear.   Eyes:      General: No scleral icterus.     Extraocular Movements: Extraocular movements intact.      Conjunctiva/sclera: Conjunctivae normal.      Pupils: Pupils are equal, round, and reactive to light.   Neck:      Vascular: No JVD.   Cardiovascular:      Rate and Rhythm: Normal rate and regular rhythm.      Pulses: Normal pulses.      Heart sounds: Normal heart sounds. No murmur heard.     No friction rub. No gallop.   Pulmonary:      Effort: Pulmonary effort is normal.      Breath sounds: Normal breath sounds.   Abdominal:      General: Abdomen is flat. Bowel sounds are normal. There is no distension.      Palpations: Abdomen is soft.      Tenderness: There is no abdominal tenderness.   Musculoskeletal:         General: Normal range of motion.      Cervical back: Normal range of motion and neck supple.      Right lower leg: No edema.      Left lower leg: No edema.   Skin:     General: Skin is warm and dry.      Capillary Refill: Capillary refill takes less than 2 seconds.      Coloration: Skin is not jaundiced.   Neurological:      General: No focal deficit present.      Mental Status: He is alert and oriented to person, place, and time. Mental status is at baseline.   Psychiatric:         Mood and Affect: Mood normal.         Behavior: Behavior normal.         Judgment: Judgment normal.         Lab Review  Lab Results   Component Value Date/Time    WBC 9.2 01/30/2024 03:06 AM     RBC 4.98 01/30/2024 03:06 AM    HEMOGLOBIN 15.0 01/30/2024 03:06 AM    HEMATOCRIT 45.3 01/30/2024 03:06 AM    MCV 91.0 01/30/2024 03:06 AM    MCH 30.1 01/30/2024 03:06 AM    MCHC 33.1 01/30/2024 03:06 AM    MPV 11.4 01/30/2024 03:06 AM      Lab Results   Component Value Date/Time    SODIUM 138 01/30/2024 03:06 AM    POTASSIUM 3.5 (L) 01/30/2024 03:06 AM    CHLORIDE 105 01/30/2024 03:06 AM    CO2 22 01/30/2024 03:06 AM    GLUCOSE 109 (H) 01/30/2024 03:06 AM    BUN 13 01/30/2024 03:06 AM    CREATININE 0.74 01/30/2024 03:06 AM      Lab Results   Component Value Date/Time    ASTSGOT 17 01/30/2024 03:06 AM    ALTSGPT 27 01/30/2024 03:06 AM     Lab Results   Component Value Date/Time    CHOLSTRLTOT 240 (H) 01/29/2024 04:19 AM     (H) 01/29/2024 04:19 AM    HDL 73 01/29/2024 04:19 AM    TRIGLYCERIDE 120 01/29/2024 04:19 AM    TROPONINT 45 (H) 01/29/2024 10:38 AM       Recent Labs     01/28/24  1110   NTPROBNP 614*         Cardiac Imaging and Procedures Review  EKG 1/29/2024  Sinus rhythm with t wave inversion anterolateral leads    Echocardiogram 1/28/2024  CONCLUSIONS  Normal left ventricular systolic function.  The left ventricular ejection fraction is visually estimated to be 55-  60%.  There is mild flattening of the interventricular septum.  Normal diastolic function.  The right ventricle is severely dilated with reduced systolic function.  Estimated right ventricular systolic pressure is 50 mmHg + right atrial   pressure.  The inferior vena cava is not well visualized.  Mild tricuspid regurgitation.    Cardiac Catheterization:  Pending    Assessment/Plan  #NSTEMI  # Dyslipidemia intolerant to lipitor  #History of mitral valve repair  #Cdiff     Recommendations:  - No current cardiac symptoms. Chest pain has improved after heparin gtt.  -Now with diarrhea due to Cdiff, but states he can lay for at least an hour  -Previously intolerant to Lipitor. He is willing to try Crestor. If he develops myalgias, consider  dosing as 3x weekly.   - He remains mildly hypertensive increase carvedilol to 6.25mg BID and lisinopril to 5mg daily.  - Continue heparin gtt and aspirin.   - LVEF 55-60% on echo  -Meds-to-beds on discharge  -Discussed CV risk factor modification including cholesterol management, blood pressure management, smoking cessation, diet and lifestyle changes.   - Keep NPO. Plan for LHC later today.    Cardiology will continue to follow.    Please contact me with any questions.    Thank you for allowing me to participate in the care of Osman Guzmán .    Jaja Spence PA-C, Cardiology  [facility] [address]  [address] [address]  Phone: [phone]  Fax: [phone]    PLEASE NOTE: This note was created using voice recognition software. I have made every reasonable attempt to correct obvious errors, but I expect that there are errors of grammar and possibly content that I did not discover before finalizing the note.     I personally spent a total of 15 minutes which includes face-to-face time and non-face-to-face time spent on preparing to see the patient, reviewing hospital notes and tests, obtaining history from the patient, performing a medically appropriate exam, counseling and educating the patient, ordering medications/tests/procedures/referrals as clinically indicated, and documenting information in the electronic medical record.

## 2024-01-31 ENCOUNTER — APPOINTMENT (OUTPATIENT)
Dept: CARDIOLOGY | Facility: MEDICAL CENTER | Age: 69
DRG: 322 | End: 2024-01-31
Attending: INTERNAL MEDICINE
Payer: MEDICARE

## 2024-01-31 LAB
ALBUMIN SERPL BCP-MCNC: 3.2 G/DL (ref 3.2–4.9)
BUN SERPL-MCNC: 12 MG/DL (ref 8–22)
CALCIUM ALBUM COR SERPL-MCNC: 9.1 MG/DL (ref 8.5–10.5)
CALCIUM SERPL-MCNC: 8.5 MG/DL (ref 8.5–10.5)
CHLORIDE SERPL-SCNC: 108 MMOL/L (ref 96–112)
CO2 SERPL-SCNC: 21 MMOL/L (ref 20–33)
CREAT SERPL-MCNC: 0.73 MG/DL (ref 0.5–1.4)
ERYTHROCYTE [DISTWIDTH] IN BLOOD BY AUTOMATED COUNT: 47.9 FL (ref 35.9–50)
GFR SERPLBLD CREATININE-BSD FMLA CKD-EPI: 99 ML/MIN/1.73 M 2
GLUCOSE BLD STRIP.AUTO-MCNC: 115 MG/DL (ref 65–99)
GLUCOSE BLD STRIP.AUTO-MCNC: 83 MG/DL (ref 65–99)
GLUCOSE BLD STRIP.AUTO-MCNC: 83 MG/DL (ref 65–99)
GLUCOSE BLD STRIP.AUTO-MCNC: 84 MG/DL (ref 65–99)
GLUCOSE SERPL-MCNC: 98 MG/DL (ref 65–99)
HCT VFR BLD AUTO: 41.6 % (ref 42–52)
HGB BLD-MCNC: 14.4 G/DL (ref 14–18)
MAGNESIUM SERPL-MCNC: 2 MG/DL (ref 1.5–2.5)
MCH RBC QN AUTO: 31 PG (ref 27–33)
MCHC RBC AUTO-ENTMCNC: 34.6 G/DL (ref 32.3–36.5)
MCV RBC AUTO: 89.7 FL (ref 81.4–97.8)
PHOSPHATE SERPL-MCNC: 3.9 MG/DL (ref 2.5–4.5)
PLATELET # BLD AUTO: 143 K/UL (ref 164–446)
PMV BLD AUTO: 10.9 FL (ref 9–12.9)
POTASSIUM SERPL-SCNC: 4 MMOL/L (ref 3.6–5.5)
RBC # BLD AUTO: 4.64 M/UL (ref 4.7–6.1)
SODIUM SERPL-SCNC: 138 MMOL/L (ref 135–145)
UFH PPP CHRO-ACNC: 0.34 IU/ML
WBC # BLD AUTO: 7.3 K/UL (ref 4.8–10.8)

## 2024-01-31 PROCEDURE — 85520 HEPARIN ASSAY: CPT

## 2024-01-31 PROCEDURE — 700102 HCHG RX REV CODE 250 W/ 637 OVERRIDE(OP): Performed by: HOSPITALIST

## 2024-01-31 PROCEDURE — 83735 ASSAY OF MAGNESIUM: CPT

## 2024-01-31 PROCEDURE — 700111 HCHG RX REV CODE 636 W/ 250 OVERRIDE (IP): Performed by: INTERNAL MEDICINE

## 2024-01-31 PROCEDURE — 700111 HCHG RX REV CODE 636 W/ 250 OVERRIDE (IP): Mod: JG | Performed by: HOSPITALIST

## 2024-01-31 PROCEDURE — A9270 NON-COVERED ITEM OR SERVICE: HCPCS | Performed by: INTERNAL MEDICINE

## 2024-01-31 PROCEDURE — 770020 HCHG ROOM/CARE - TELE (206)

## 2024-01-31 PROCEDURE — A9270 NON-COVERED ITEM OR SERVICE: HCPCS | Performed by: PHYSICIAN ASSISTANT

## 2024-01-31 PROCEDURE — 700105 HCHG RX REV CODE 258: Performed by: INTERNAL MEDICINE

## 2024-01-31 PROCEDURE — 700102 HCHG RX REV CODE 250 W/ 637 OVERRIDE(OP): Performed by: PHYSICIAN ASSISTANT

## 2024-01-31 PROCEDURE — 700102 HCHG RX REV CODE 250 W/ 637 OVERRIDE(OP): Performed by: INTERNAL MEDICINE

## 2024-01-31 PROCEDURE — 700101 HCHG RX REV CODE 250: Performed by: INTERNAL MEDICINE

## 2024-01-31 PROCEDURE — A9270 NON-COVERED ITEM OR SERVICE: HCPCS | Performed by: HOSPITALIST

## 2024-01-31 PROCEDURE — 99233 SBSQ HOSP IP/OBS HIGH 50: CPT | Performed by: HOSPITALIST

## 2024-01-31 PROCEDURE — 700111 HCHG RX REV CODE 636 W/ 250 OVERRIDE (IP): Performed by: HOSPITALIST

## 2024-01-31 PROCEDURE — 82962 GLUCOSE BLOOD TEST: CPT | Mod: 91

## 2024-01-31 PROCEDURE — 36415 COLL VENOUS BLD VENIPUNCTURE: CPT

## 2024-01-31 PROCEDURE — 80069 RENAL FUNCTION PANEL: CPT

## 2024-01-31 PROCEDURE — 85027 COMPLETE CBC AUTOMATED: CPT

## 2024-01-31 RX ORDER — LISINOPRIL 20 MG/1
20 TABLET ORAL
Status: DISCONTINUED | OUTPATIENT
Start: 2024-02-01 | End: 2024-02-01

## 2024-01-31 RX ORDER — LACTOBACILLUS RHAMNOSUS GG 10B CELL
1 CAPSULE ORAL
Status: DISCONTINUED | OUTPATIENT
Start: 2024-01-31 | End: 2024-01-31

## 2024-01-31 RX ORDER — LABETALOL HYDROCHLORIDE 5 MG/ML
10 INJECTION, SOLUTION INTRAVENOUS EVERY 6 HOURS PRN
Status: DISCONTINUED | OUTPATIENT
Start: 2024-01-31 | End: 2024-02-02 | Stop reason: HOSPADM

## 2024-01-31 RX ADMIN — SODIUM CHLORIDE, POTASSIUM CHLORIDE, SODIUM LACTATE AND CALCIUM CHLORIDE: 600; 310; 30; 20 INJECTION, SOLUTION INTRAVENOUS at 17:39

## 2024-01-31 RX ADMIN — VANCOMYCIN HYDROCHLORIDE 125 MG: 5 INJECTION, POWDER, LYOPHILIZED, FOR SOLUTION INTRAVENOUS at 00:15

## 2024-01-31 RX ADMIN — ROSUVASTATIN 10 MG: 10 TABLET, FILM COATED ORAL at 17:40

## 2024-01-31 RX ADMIN — CARVEDILOL 6.25 MG: 6.25 TABLET, FILM COATED ORAL at 07:30

## 2024-01-31 RX ADMIN — HEPARIN SODIUM 14 UNITS/KG/HR: 5000 INJECTION, SOLUTION INTRAVENOUS at 17:36

## 2024-01-31 RX ADMIN — ASPIRIN 81 MG: 81 TABLET, COATED ORAL at 05:55

## 2024-01-31 RX ADMIN — VANCOMYCIN HYDROCHLORIDE 125 MG: 5 INJECTION, POWDER, LYOPHILIZED, FOR SOLUTION INTRAVENOUS at 16:59

## 2024-01-31 RX ADMIN — OXYCODONE 5 MG: 5 TABLET ORAL at 17:47

## 2024-01-31 RX ADMIN — LABETALOL HYDROCHLORIDE 10 MG: 5 INJECTION, SOLUTION INTRAVENOUS at 13:35

## 2024-01-31 RX ADMIN — VANCOMYCIN HYDROCHLORIDE 125 MG: 5 INJECTION, POWDER, LYOPHILIZED, FOR SOLUTION INTRAVENOUS at 05:55

## 2024-01-31 RX ADMIN — OXYCODONE 5 MG: 5 TABLET ORAL at 20:56

## 2024-01-31 RX ADMIN — CARVEDILOL 6.25 MG: 6.25 TABLET, FILM COATED ORAL at 16:59

## 2024-01-31 RX ADMIN — LISINOPRIL 15 MG: 5 TABLET ORAL at 13:39

## 2024-01-31 RX ADMIN — Medication 1 CAPSULE: at 09:27

## 2024-01-31 RX ADMIN — HYDROMORPHONE HYDROCHLORIDE 0.25 MG: 1 INJECTION, SOLUTION INTRAMUSCULAR; INTRAVENOUS; SUBCUTANEOUS at 22:26

## 2024-01-31 RX ADMIN — SODIUM CHLORIDE, POTASSIUM CHLORIDE, SODIUM LACTATE AND CALCIUM CHLORIDE: 600; 310; 30; 20 INJECTION, SOLUTION INTRAVENOUS at 05:58

## 2024-01-31 RX ADMIN — VANCOMYCIN HYDROCHLORIDE 125 MG: 5 INJECTION, POWDER, LYOPHILIZED, FOR SOLUTION INTRAVENOUS at 13:39

## 2024-01-31 RX ADMIN — OXYCODONE 5 MG: 5 TABLET ORAL at 02:50

## 2024-01-31 RX ADMIN — LISINOPRIL 5 MG: 5 TABLET ORAL at 05:55

## 2024-01-31 ASSESSMENT — ENCOUNTER SYMPTOMS
COLOR CHANGE: 0
CHEST TIGHTNESS: 0
SHORTNESS OF BREATH: 0
CHILLS: 0
ABDOMINAL PAIN: 0
DIAPHORESIS: 0
NERVOUS/ANXIOUS: 1
BRUISES/BLEEDS EASILY: 0
MYALGIAS: 0
NECK PAIN: 0
FEVER: 0
SPEECH CHANGE: 0
WEIGHT LOSS: 0
CLAUDICATION: 0
VOMITING: 0
ABDOMINAL DISTENTION: 0
FATIGUE: 0
BLURRED VISION: 0
DIARRHEA: 1
PHOTOPHOBIA: 0
CONSTIPATION: 0
WEAKNESS: 0
LIGHT-HEADEDNESS: 0
NAUSEA: 0
COUGH: 0
HEMOPTYSIS: 0
ORTHOPNEA: 0
ENDOCRINE NEGATIVE: 1
EYES NEGATIVE: 1
PALPITATIONS: 0
PSYCHIATRIC NEGATIVE: 1
ARTHRALGIAS: 0
ABDOMINAL PAIN: 1
DIZZINESS: 0

## 2024-01-31 ASSESSMENT — PAIN DESCRIPTION - PAIN TYPE
TYPE: ACUTE PAIN
TYPE: ACUTE PAIN

## 2024-01-31 ASSESSMENT — FIBROSIS 4 INDEX: FIB4 SCORE: 1.53

## 2024-01-31 NOTE — DOCUMENTATION QUERY
[facility]                                                                       Query Response Note      PATIENT:               GILL CHASE  ACCT #:                  [id number]  MRN:                     [id number]  :                      1955  ADMIT DATE:       2024 10:39 AM  DISCH DATE:          RESPONDING  PROVIDER #:        [id number]           QUERY TEXT:    ''Possible atrial fibrillation'' is documented in the medical record.     Please clarify status of this condition:       The patient's Clinical Indicators include:  Findings:  --Patient admitted for NSTEMI and elevated BNP  --Per H&P, ''Arrhythmia possible atrial fibrillation'' and ''Transferred report indicates the patient has atrial      fibrillation, patient is not aware of this diagnosis, no EKG documentation to support this'' documented  --EKG on  admission:  SR with poor R wave progression, rate of 79  --EKG from :  SR with borderline right axis deviation, rate of 82  --Echocardiogram from :  Normal left ventricular systolic function. The left ventricular ejection fraction      is visually estimated to be 55-60%, normal diastolic function    Treatment:  --EKG  --Telemetry  --Heparin gtt  --Pending [facility]  --Cardiology consult    Risk Factors:  --NSTEMI  --Pulmonary HTN    Thank you,  Milagro Mora RN, BSN  Clinical   Connect via [employer]  Options provided:   -- Atrial fibrillation is ruled in, please specify type of atrial fibrillation   -- Atrial fibrillation is ruled out   -- Other explanation, (please specify other explanation)   -- Unable to determine      Query created by: Milagro Mora on 2024 10:36 AM    RESPONSE TEXT:    Atrial fibrillation is ruled out          Electronically signed by:  KATY CALHOUN MD 2024 11:13 AM

## 2024-01-31 NOTE — PROGRESS NOTES
Cardiology Follow Up Progress Note    Date of Service  1/31/2024    Attending Physician  Kierra Barragan M.D.    Chief Complaint   Chest pain    HPI  Osman Guzmán is a 68 y.o. male with a history of mitral valve repair and post op afib admitted 1/28/2024 with NSTEMI.     Interim Events  1/31/202: No cardiac events overnight. Sinus rhythm to sinus donnie on telemetry. Vital signs stable. SPO2 97% on room air. He reports he is doing well today and that his diarrhea ended last night. He feels as though the antibiotics are help immensely. No chest pain or palpitations. No shortness of breath, dyspnea on exertion, orthopnea or PND. No lower extremity edema. No dizziness or lightheadedness. No syncope or presyncope.      1/30/2024: He tested positive for Cdiff and cath was delayed to today. Sinus rhythm on telemetry. Vital signs stable with mild hypertension. He reports he is feeling mildly better. He does continue to have diarrhea, but it is minimal output. No chest pain or palpitations. No shortness of breath, dyspnea on exertion, orthopnea or PND. No lower extremity edema. No dizziness or lightheadedness. No syncope or presyncope.      1/29/2024: No cardiac events overnight. Sinus rhythm on telemetry. Vital signs stable. Her reports he is doing well other than significant diarrhea. No chest pain or palpitations. No shortness of breath, dyspnea on exertion, orthopnea or PND. No lower extremity edema. No dizziness or lightheadedness. No syncope or presyncope.        Review of Systems  Review of Systems   Constitutional:  Negative for chills, diaphoresis, fatigue and fever.   HENT: Negative.     Eyes: Negative.    Respiratory:  Negative for cough, chest tightness and shortness of breath.    Cardiovascular:  Negative for chest pain, palpitations and leg swelling.   Gastrointestinal:  Positive for diarrhea. Negative for abdominal distention, abdominal pain, constipation, nausea and vomiting.   Endocrine: Negative.     Genitourinary:  Negative for decreased urine volume, difficulty urinating, dysuria, frequency and urgency.   Musculoskeletal:  Negative for arthralgias and myalgias.   Skin:  Negative for color change.   Neurological:  Negative for dizziness, syncope and light-headedness.   Hematological:  Does not bruise/bleed easily.   Psychiatric/Behavioral: Negative.         Vital signs in last 24 hours  Temp:  [36.1 °C (97 °F)-36.6 °C (97.9 °F)] 36.6 °C (97.9 °F)  Pulse:  [59-69] 61  Resp:  [12-18] 16  BP: (124-145)/(72-79) 133/75  SpO2:  [92 %-97 %] 97 %    Physical Exam  Physical Exam  Vitals and nursing note reviewed.   Constitutional:       General: He is not in acute distress.     Appearance: Normal appearance. He is not toxic-appearing.   HENT:      Head: Normocephalic and atraumatic.      Right Ear: External ear normal.      Left Ear: External ear normal.      Nose: Nose normal.      Mouth/Throat:      Mouth: Mucous membranes are moist.      Pharynx: Oropharynx is clear.   Eyes:      General: No scleral icterus.     Extraocular Movements: Extraocular movements intact.      Conjunctiva/sclera: Conjunctivae normal.      Pupils: Pupils are equal, round, and reactive to light.   Neck:      Vascular: No JVD.   Cardiovascular:      Rate and Rhythm: Normal rate and regular rhythm.      Pulses: Normal pulses.      Heart sounds: Normal heart sounds. No murmur heard.     No friction rub. No gallop.   Pulmonary:      Effort: Pulmonary effort is normal.      Breath sounds: Normal breath sounds.   Abdominal:      General: Abdomen is flat. Bowel sounds are normal. There is no distension.      Palpations: Abdomen is soft.      Tenderness: There is no abdominal tenderness.   Musculoskeletal:         General: Normal range of motion.      Cervical back: Normal range of motion and neck supple.      Right lower leg: No edema.      Left lower leg: No edema.   Skin:     General: Skin is warm and dry.      Capillary Refill: Capillary refill  takes less than 2 seconds.      Coloration: Skin is not jaundiced.   Neurological:      General: No focal deficit present.      Mental Status: He is alert and oriented to person, place, and time. Mental status is at baseline.   Psychiatric:         Mood and Affect: Mood normal.         Behavior: Behavior normal.         Judgment: Judgment normal.         Lab Review  Lab Results   Component Value Date/Time    WBC 7.3 01/31/2024 01:27 AM    RBC 4.64 (L) 01/31/2024 01:27 AM    HEMOGLOBIN 14.4 01/31/2024 01:27 AM    HEMATOCRIT 41.6 (L) 01/31/2024 01:27 AM    MCV 89.7 01/31/2024 01:27 AM    MCH 31.0 01/31/2024 01:27 AM    MCHC 34.6 01/31/2024 01:27 AM    MPV 10.9 01/31/2024 01:27 AM      Lab Results   Component Value Date/Time    SODIUM 138 01/31/2024 01:27 AM    POTASSIUM 4.0 01/31/2024 01:27 AM    CHLORIDE 108 01/31/2024 01:27 AM    CO2 21 01/31/2024 01:27 AM    GLUCOSE 98 01/31/2024 01:27 AM    BUN 12 01/31/2024 01:27 AM    CREATININE 0.73 01/31/2024 01:27 AM      Lab Results   Component Value Date/Time    ASTSGOT 17 01/30/2024 03:06 AM    ALTSGPT 27 01/30/2024 03:06 AM     Lab Results   Component Value Date/Time    CHOLSTRLTOT 240 (H) 01/29/2024 04:19 AM     (H) 01/29/2024 04:19 AM    HDL 73 01/29/2024 04:19 AM    TRIGLYCERIDE 120 01/29/2024 04:19 AM    TROPONINT 45 (H) 01/29/2024 10:38 AM       Recent Labs     01/28/24  1110   NTPROBNP 614*         Cardiac Imaging and Procedures Review  EKG 1/29/2024  Sinus rhythm with t wave inversion anterolateral leads    Echocardiogram 1/28/2024  CONCLUSIONS  Normal left ventricular systolic function.  The left ventricular ejection fraction is visually estimated to be 55-  60%.  There is mild flattening of the interventricular septum.  Normal diastolic function.  The right ventricle is severely dilated with reduced systolic function.  Estimated right ventricular systolic pressure is 50 mmHg + right atrial   pressure.  The inferior vena cava is not well visualized.  Mild  tricuspid regurgitation.    Cardiac Catheterization:  Pending    Assessment/Plan  #NSTEMI  # Dyslipidemia intolerant to lipitor  #History of mitral valve repair  #Cdiff     Recommendations:  - No current cardiac symptoms. Chest pain has improved after heparin gtt.  -Diarrhea resolved overnight.   -Previously intolerant to Lipitor. He is willing to try Crestor. If he develops myalgias, consider dosing as 3x weekly.   - Continue carvedilol 6.25mg BID and lisinopril 5mg daily. Titrate as needed  - Continue heparin gtt and aspirin.   - LVEF 55-60% on echo  -Meds-to-beds on discharge  -Discussed CV risk factor modification including cholesterol management, blood pressure management, smoking cessation, diet and lifestyle changes.   - Cath was delayed yesterday due to emergent cath. Keep NPO. Plan for LHC later today.    Cardiology will continue to follow.    Please contact me with any questions.    Thank you for allowing me to participate in the care of Osman Guzmán .    Jaja Spence PA-C, Cardiology  [facility], [facility]  [address] [address]  Phone: [phone]  Fax: [phone]    PLEASE NOTE: This note was created using voice recognition software. I have made every reasonable attempt to correct obvious errors, but I expect that there are errors of grammar and possibly content that I did not discover before finalizing the note.     I personally spent a total of 15 minutes which includes face-to-face time and non-face-to-face time spent on preparing to see the patient, reviewing hospital notes and tests, obtaining history from the patient, performing a medically appropriate exam, counseling and educating the patient, ordering medications/tests/procedures/referrals as clinically indicated, and documenting information in the electronic medical record.

## 2024-01-31 NOTE — CARE PLAN
The patient is Stable - Low risk of patient condition declining or worsening    Shift Goals  Clinical Goals: heparin gtt, heart cath tomorrow  Patient Goals: rest, pain managment, heart cath tomorrow    Progress made toward(s) clinical / shift goals:    Problem: Knowledge Deficit - Standard  Goal: Patient and family/care givers will demonstrate understanding of plan of care, disease process/condition, diagnostic tests and medications  Description: Target End Date:  1-3 days or as soon as patient condition allows    Document in Patient Education    1.  Patient and family/caregiver oriented to unit, equipment, visitation policy and means for communicating concern  2.  Complete/review Learning Assessment  3.  Assess knowledge level of disease process/condition, treatment plan, diagnostic tests and medications  4.  Explain disease process/condition, treatment plan, diagnostic tests and medications  Outcome: Progressing     Problem: Psychosocial  Goal: Patient's level of anxiety will decrease  Description: Target End Date:  1-3 days or as soon as patient condition allows    1.  Collaborate with patient and family/caregiver to identify triggers and develop strategies to cope with anxiety  2.  Implement stimuli reduction, calming techniques  3.  Pharmacologic management per provider order  4.  Encourage patient/family/care giver participation  5.  Collaborate with interdisciplinary team including Psychologist or Behavioral Health Team as needed  Outcome: Progressing     Problem: Communication  Goal: The ability to communicate needs accurately and effectively will improve  Description: Target End Date:  End of day 1    1.  Assess ability to communicate and understand  2.  Provide augmentative or alternative methods of communication devices  3.  Use /language line as appropriate  4.  Collaborate with Speech Therapy as needed  Outcome: Progressing     Problem: Hemodynamics  Goal: Patient's hemodynamics, fluid  balance and neurologic status will be stable or improve  Description: Target End Date:  Prior to discharge or change in level of care    Document on Assessment and I/O flowsheet templates    1.  Monitor vital signs, pulse oximetry and cardiac monitor per provider order and/or policy  2.  Maintain blood pressure per provider order  3.  Hemodynamic monitoring per provider order  4.  Manage IV fluids and IV infusions  5.  Monitor intake and output  6.  Daily weights per unit policy or provider order  7.  Assess peripheral pulses and capillary refill  8.  Assess color and body temperature  9.  Position patient for maximum circulation/cardiac output  10. Monitor for signs/symptoms of excessive bleeding  11. Assess mental status, restlessness and changes in level of consciousness  12. Monitor temperature and report fever or hypothermia to provider immediately. Consideration of targeted temperature management.  Outcome: Progressing     Problem: Respiratory  Goal: Patient will achieve/maintain optimum respiratory ventilation and gas exchange  Description: Target End Date:  Prior to discharge or change in level of care    Document on Assessment flowsheet    1.  Assess and monitor rate, rhythm, depth and effort of respiration  2.  Breath sounds assessed qshift and/or as needed  3.  Assess O2 saturation, administer/titrate oxygen as ordered  4.  Position patient for maximum ventilatory efficiency  5.  Turn, cough, and deep breath with splinting to improve effectiveness  6.  Collaborate with RT to administer medication/treatments per order  7.  Encourage use of incentive spirometer and encourage patient to cough after use and utilize splinting techniques if applicable  8.  Airway suctioning  9.  Monitor sputum production for changes in color, consistency and frequency  10. Perform frequent oral hygiene  11. Alternate physical activity with rest periods  Outcome: Progressing     Problem: Chest Tube Management  Goal:  Complications related to chest tube will be avoided or minimized  Description: Target End Date:  when tube discontinued    1.  Frequent respiratory assessments  2.  Encourage cough and deep breathing exercises  3.  Encourage mobility and meals out of bed  4.  Monitor chest tube output (drainage amount/color)  5.  Assess chest tube connection sites to ensure tube is patent with no air leaks  6.  Ensure water-seal chamber is at correct level  7.  Ensure appropriate level of function (water-seal, -20 cm H20, etc...)  8.  Monitor for sign/symptoms of crepitus  Outcome: Progressing       Patient is not progressing towards the following goals:

## 2024-01-31 NOTE — PROGRESS NOTES
Hospital Medicine Daily Progress Note    Date of Service  1/31/2024    Chief Complaint  Osman Guzmán is a 68 y.o. male admitted 1/28/2024 with chest pain    Hospital Course    This is a 68-year-old male with a past medical history significant for placement 10 years ago presented on 1/28 with a complaint of chest pain/chest pressure.  EKG showed T wave inversion in lateral leads with mild elevated troponin thus was transferred to [facility] for cardiology consultation.  Prior to transfer, patient was started on ASA, heparin drip, statin, BB, lisinopril.    Cardiology consulted, plan for cardiac cath today, currently n.p.o. at midnight.  His hospital course was complicated  c-dif colitis, will continue oral vancomycin    Interval events:  -- No acute events overnight, medicine administered, patient saturating well on room air.  Patient denies any chest pain abdominal evaluation  --Current continue to follow, currently patient is on heparin drip.  Will monitor antigen every 6 hours and titrate heparin drip accordingly.  --Patient 3.5 replete, magnesium 1.7 replete  --Continue oral vancomycin    1/31:  -- No acute events overnight, patient blood pressure slightly evaluated; patient has been stable.    --Cardiology  continue to follow, currently patient n.p.o., will be going for cardiac cath  --Continue heparin drip  --K at 4 , monitor  -- Continue oral vancomycin for C. difficile, continue isolation per protocol.  Monitor bowel movements    Patient that he is chest pain free since he was started on heparin drip    I have discussed this patient's plan of care and discharge plan at IDT rounds today with Case Management, Nursing, Nursing leadership, and other members of the IDT team.    Consultants/Specialty  cardiology    Code Status  Full Code    Disposition  The patient is not medically cleared for discharge to home or a post-acute facility.  Anticipate discharge to: home with organized home healthcare and close outpatient  follow-up    I have placed the appropriate orders for post-discharge needs.    Review of Systems  Review of Systems   Constitutional:  Negative for weight loss.   HENT:  Negative for congestion and hearing loss.    Eyes:  Negative for blurred vision and photophobia.   Respiratory:  Negative for cough and hemoptysis.    Cardiovascular:  Negative for chest pain, orthopnea and claudication.   Gastrointestinal:  Positive for abdominal pain and diarrhea. Negative for constipation and nausea.   Genitourinary:  Negative for dysuria, frequency and urgency.   Musculoskeletal:  Negative for myalgias and neck pain.   Neurological:  Negative for dizziness, speech change and weakness.   Psychiatric/Behavioral:  The patient is nervous/anxious.    All other systems reviewed and are negative.       Physical Exam  Temp:  [36.2 °C (97.2 °F)-36.6 °C (97.9 °F)] 36.6 °C (97.9 °F)  Pulse:  [59-65] 65  Resp:  [12-18] 18  BP: (124-178)/(72-96) 134/76  SpO2:  [92 %-98 %] 98 %    Physical Exam  Vitals and nursing note reviewed.   Constitutional:       Appearance: Normal appearance. He is not ill-appearing.   HENT:      Head: Normocephalic and atraumatic.   Eyes:      Extraocular Movements: Extraocular movements intact.      Pupils: Pupils are equal, round, and reactive to light.   Cardiovascular:      Rate and Rhythm: Normal rate.   Pulmonary:      Effort: No respiratory distress.      Breath sounds: No wheezing.   Abdominal:      General: There is no distension.      Tenderness: There is no abdominal tenderness.   Musculoskeletal:      Right lower leg: No edema.      Left lower leg: No edema.   Lymphadenopathy:      Cervical: No cervical adenopathy.   Skin:     Coloration: Skin is not jaundiced.   Neurological:      Mental Status: He is alert and oriented to person, place, and time. Mental status is at baseline.      Cranial Nerves: No cranial nerve deficit.      Motor: No weakness.      Gait: Gait normal.   Psychiatric:         Mood and  Affect: Mood normal.         Fluids  No intake or output data in the 24 hours ending 01/31/24 1537      Laboratory  Recent Labs     01/29/24  0419 01/30/24  0306 01/31/24  0127   WBC 12.0* 9.2 7.3   RBC 5.13 4.98 4.64*   HEMOGLOBIN 15.7 15.0 14.4   HEMATOCRIT 45.4 45.3 41.6*   MCV 88.5 91.0 89.7   MCH 30.6 30.1 31.0   MCHC 34.6 33.1 34.6   RDW 48.6 50.2* 47.9   PLATELETCT 194 177 143*   MPV 11.0 11.4 10.9       Recent Labs     01/29/24  0419 01/30/24  0306 01/31/24  0127   SODIUM 136 138 138   POTASSIUM 4.0 3.5* 4.0   CHLORIDE 103 105 108   CO2 23 22 21   GLUCOSE 131* 109* 98   BUN 12 13 12   CREATININE 0.79 0.74 0.73   CALCIUM 9.1 9.1 8.5                 Recent Labs     01/29/24  0419   TRIGLYCERIDE 120   HDL 73   *         Imaging  CT-CTA CHEST PULMONARY ARTERY W/ RECONS   Final Result      1.  No CT evidence of pulmonary embolism.      2.  There is calcific atherosclerosis including the coronary arteries.            EC-ECHOCARDIOGRAM COMPLETE W/O CONT   Final Result      CL-LEFT HEART CATHETERIZATION WITH POSSIBLE INTERVENTION    (Results Pending)        Assessment/Plan  * NSTEMI (non-ST elevated myocardial infarction) (HCC)- (present on admission)  Assessment & Plan    Pressure chest pain with this changes on EKG and mildly elevated troponin  Started with heparin drip, aspirin and rosuvastatin, acei and bb    -- echo reviewed, cards called and planning for cardiac cath    --npo now, will be going for cardiac cath    On 1/30, cardiac cath could not complete due to complicated emergency    C. difficile colitis  Assessment & Plan  Patient developed diarrhea after admission in around 12 hours  Mild leukocytosis and no kidney failure  C. difficile test was positive  Vancomycin orally was started on 1/29; will continue for a total of 10 days    Pulmonary hypertension (HCC)  Assessment & Plan  Echo showed normal ejection fraction with right ventricular systolic pressure  CTA didn't show  pe    Diarrhea  Assessment & Plan  C-dif +ve   Continue oral vanco    -- monitor BM   -- isolation per protocol    History of mitral valve repair  Assessment & Plan  More than 10 years ago  Echo reviewd    Arrhythmia possible atrial fibrillation- (present on admission)  Assessment & Plan  No history of atrial fibrillation  EKG and telemetry shows sinus rhythm  On heparin gtt    -- cards following     Elevated brain natriuretic peptide (BNP) level- (present on admission)  Assessment & Plan  Echo reviewed and noted to have moderate pulm htn    Abnormal EKG- (present on admission)  Assessment & Plan  ST depression and T inversion on lateral leads   continue aspirin with heparin drip    Cardiac cath    Prediabetes- (present on admission)  Assessment & Plan  A1c 5.9  Encouraged the patient for healthy diet           VTE prophylaxis: heparin gtt     I have performed a physical exam and reviewed and updated ROS and Plan today (1/31/2024). In review of yesterday's note (1/30/2024), there are no changes except as documented above.

## 2024-01-31 NOTE — PROGRESS NOTES
Bedside report received, assumed care of patient. Up to the bathroom, denied pain. A&O x4. Tele monitoring in place. Educated on fall risk, all fall precautions in place. Call light within reach, bed locked and in lowest position, denied other needs at this time.

## 2024-01-31 NOTE — PROGRESS NOTES
Bedside report received from Keira CANALES. Patient awake and alert in bed. Heparin drip verified. Patient bp rechecked - given labetolol and scheduled meds. Will recheck in 30min. Patient denying any pain. Call bell within reach.

## 2024-01-31 NOTE — PROGRESS NOTES
Telemetry Shift Summary     Rhythm: SB-SR  HR: 54-70  Ectopy: PVC, BIGEMINY    Measurements: .19/.13/.44              Normal Values  Rhythm: SR  HR:   Measurements: 0.12-0.20/0.08-0.10/0.30-0.52

## 2024-02-01 ENCOUNTER — APPOINTMENT (OUTPATIENT)
Dept: RADIOLOGY | Facility: MEDICAL CENTER | Age: 69
DRG: 322 | End: 2024-02-01
Attending: HOSPITALIST
Payer: MEDICARE

## 2024-02-01 ENCOUNTER — APPOINTMENT (OUTPATIENT)
Dept: CARDIOLOGY | Facility: MEDICAL CENTER | Age: 69
DRG: 322 | End: 2024-02-01
Attending: INTERNAL MEDICINE
Payer: MEDICARE

## 2024-02-01 LAB
ACT BLD: 212 SEC (ref 74–137)
ACT BLD: 228 SEC (ref 74–137)
ACT BLD: 342 SEC (ref 74–137)
ALBUMIN SERPL BCP-MCNC: 3.2 G/DL (ref 3.2–4.9)
BACTERIA STL CULT: ABNORMAL
BACTERIA STL CULT: ABNORMAL
BUN SERPL-MCNC: 10 MG/DL (ref 8–22)
CALCIUM ALBUM COR SERPL-MCNC: 9.1 MG/DL (ref 8.5–10.5)
CALCIUM SERPL-MCNC: 8.5 MG/DL (ref 8.5–10.5)
CHLORIDE SERPL-SCNC: 109 MMOL/L (ref 96–112)
CO2 SERPL-SCNC: 23 MMOL/L (ref 20–33)
CREAT SERPL-MCNC: 0.73 MG/DL (ref 0.5–1.4)
E COLI SXT1+2 STL IA: ABNORMAL
EKG IMPRESSION: NORMAL
ERYTHROCYTE [DISTWIDTH] IN BLOOD BY AUTOMATED COUNT: 46.3 FL (ref 35.9–50)
GFR SERPLBLD CREATININE-BSD FMLA CKD-EPI: 99 ML/MIN/1.73 M 2
GLUCOSE BLD STRIP.AUTO-MCNC: 107 MG/DL (ref 65–99)
GLUCOSE BLD STRIP.AUTO-MCNC: 108 MG/DL (ref 65–99)
GLUCOSE BLD STRIP.AUTO-MCNC: 94 MG/DL (ref 65–99)
GLUCOSE SERPL-MCNC: 97 MG/DL (ref 65–99)
HCT VFR BLD AUTO: 39.2 % (ref 42–52)
HGB BLD-MCNC: 13.4 G/DL (ref 14–18)
MCH RBC QN AUTO: 30.3 PG (ref 27–33)
MCHC RBC AUTO-ENTMCNC: 34.2 G/DL (ref 32.3–36.5)
MCV RBC AUTO: 88.7 FL (ref 81.4–97.8)
PHOSPHATE SERPL-MCNC: 3.7 MG/DL (ref 2.5–4.5)
PLATELET # BLD AUTO: 154 K/UL (ref 164–446)
PMV BLD AUTO: 10.7 FL (ref 9–12.9)
POTASSIUM SERPL-SCNC: 3.9 MMOL/L (ref 3.6–5.5)
RBC # BLD AUTO: 4.42 M/UL (ref 4.7–6.1)
SIGNIFICANT IND 70042: ABNORMAL
SITE SITE: ABNORMAL
SODIUM SERPL-SCNC: 142 MMOL/L (ref 135–145)
SOURCE SOURCE: ABNORMAL
WBC # BLD AUTO: 6.5 K/UL (ref 4.8–10.8)

## 2024-02-01 PROCEDURE — B241ZZ3 ULTRASONOGRAPHY OF MULTIPLE CORONARY ARTERIES, INTRAVASCULAR: ICD-10-PCS | Performed by: INTERNAL MEDICINE

## 2024-02-01 PROCEDURE — 80069 RENAL FUNCTION PANEL: CPT

## 2024-02-01 PROCEDURE — 93010 ELECTROCARDIOGRAM REPORT: CPT | Performed by: INTERNAL MEDICINE

## 2024-02-01 PROCEDURE — 92978 ENDOLUMINL IVUS OCT C 1ST: CPT

## 2024-02-01 PROCEDURE — 93460 R&L HRT ART/VENTRICLE ANGIO: CPT | Mod: 26,59 | Performed by: INTERNAL MEDICINE

## 2024-02-01 PROCEDURE — 700117 HCHG RX CONTRAST REV CODE 255: Performed by: INTERNAL MEDICINE

## 2024-02-01 PROCEDURE — 99232 SBSQ HOSP IP/OBS MODERATE 35: CPT | Mod: 25,FS | Performed by: INTERNAL MEDICINE

## 2024-02-01 PROCEDURE — 71045 X-RAY EXAM CHEST 1 VIEW: CPT

## 2024-02-01 PROCEDURE — 700101 HCHG RX REV CODE 250: Performed by: INTERNAL MEDICINE

## 2024-02-01 PROCEDURE — A9270 NON-COVERED ITEM OR SERVICE: HCPCS

## 2024-02-01 PROCEDURE — 700102 HCHG RX REV CODE 250 W/ 637 OVERRIDE(OP): Performed by: HOSPITALIST

## 2024-02-01 PROCEDURE — 85347 COAGULATION TIME ACTIVATED: CPT | Mod: 91

## 2024-02-01 PROCEDURE — 93463 DRUG ADMIN & HEMODYNMIC MEAS: CPT | Mod: 59 | Performed by: INTERNAL MEDICINE

## 2024-02-01 PROCEDURE — 92979 ENDOLUMINL IVUS OCT C EA: CPT | Mod: 26,LD | Performed by: INTERNAL MEDICINE

## 2024-02-01 PROCEDURE — 93005 ELECTROCARDIOGRAM TRACING: CPT | Performed by: INTERNAL MEDICINE

## 2024-02-01 PROCEDURE — 027135Z DILATION OF CORONARY ARTERY, TWO ARTERIES WITH TWO DRUG-ELUTING INTRALUMINAL DEVICES, PERCUTANEOUS APPROACH: ICD-10-PCS | Performed by: INTERNAL MEDICINE

## 2024-02-01 PROCEDURE — 99233 SBSQ HOSP IP/OBS HIGH 50: CPT | Performed by: HOSPITALIST

## 2024-02-01 PROCEDURE — 770020 HCHG ROOM/CARE - TELE (206)

## 2024-02-01 PROCEDURE — 700102 HCHG RX REV CODE 250 W/ 637 OVERRIDE(OP): Performed by: INTERNAL MEDICINE

## 2024-02-01 PROCEDURE — A9270 NON-COVERED ITEM OR SERVICE: HCPCS | Performed by: INTERNAL MEDICINE

## 2024-02-01 PROCEDURE — 700105 HCHG RX REV CODE 258: Performed by: INTERNAL MEDICINE

## 2024-02-01 PROCEDURE — 92928 PRQ TCAT PLMT NTRAC ST 1 LES: CPT | Mod: 59,LD | Performed by: INTERNAL MEDICINE

## 2024-02-01 PROCEDURE — 82962 GLUCOSE BLOOD TEST: CPT | Mod: 91

## 2024-02-01 PROCEDURE — 700111 HCHG RX REV CODE 636 W/ 250 OVERRIDE (IP)

## 2024-02-01 PROCEDURE — A9270 NON-COVERED ITEM OR SERVICE: HCPCS | Performed by: HOSPITALIST

## 2024-02-01 PROCEDURE — 36415 COLL VENOUS BLD VENIPUNCTURE: CPT

## 2024-02-01 PROCEDURE — 700102 HCHG RX REV CODE 250 W/ 637 OVERRIDE(OP)

## 2024-02-01 PROCEDURE — 99152 MOD SED SAME PHYS/QHP 5/>YRS: CPT | Performed by: INTERNAL MEDICINE

## 2024-02-01 PROCEDURE — RXMED WILLOW AMBULATORY MEDICATION CHARGE: Performed by: HOSPITALIST

## 2024-02-01 PROCEDURE — B2111ZZ FLUOROSCOPY OF MULTIPLE CORONARY ARTERIES USING LOW OSMOLAR CONTRAST: ICD-10-PCS | Performed by: INTERNAL MEDICINE

## 2024-02-01 PROCEDURE — 92978 ENDOLUMINL IVUS OCT C 1ST: CPT | Mod: 26,RC | Performed by: INTERNAL MEDICINE

## 2024-02-01 PROCEDURE — 85027 COMPLETE CBC AUTOMATED: CPT

## 2024-02-01 PROCEDURE — 4A023N8 MEASUREMENT OF CARDIAC SAMPLING AND PRESSURE, BILATERAL, PERCUTANEOUS APPROACH: ICD-10-PCS | Performed by: INTERNAL MEDICINE

## 2024-02-01 PROCEDURE — 700111 HCHG RX REV CODE 636 W/ 250 OVERRIDE (IP): Performed by: INTERNAL MEDICINE

## 2024-02-01 PROCEDURE — 700101 HCHG RX REV CODE 250

## 2024-02-01 RX ORDER — SODIUM NITROPRUSSIDE 25 MG/ML
INJECTION INTRAVENOUS
Status: COMPLETED
Start: 2024-02-01 | End: 2024-02-01

## 2024-02-01 RX ORDER — LISINOPRIL 20 MG/1
40 TABLET ORAL
Status: DISCONTINUED | OUTPATIENT
Start: 2024-02-02 | End: 2024-02-02 | Stop reason: HOSPADM

## 2024-02-01 RX ORDER — LIDOCAINE HYDROCHLORIDE 20 MG/ML
INJECTION, SOLUTION INFILTRATION; PERINEURAL
Status: COMPLETED
Start: 2024-02-01 | End: 2024-02-01

## 2024-02-01 RX ORDER — HEPARIN SODIUM 1000 [USP'U]/ML
INJECTION, SOLUTION INTRAVENOUS; SUBCUTANEOUS
Status: COMPLETED
Start: 2024-02-01 | End: 2024-02-01

## 2024-02-01 RX ORDER — LORAZEPAM 0.5 MG/1
0.5 TABLET ORAL EVERY 4 HOURS PRN
Status: DISCONTINUED | OUTPATIENT
Start: 2024-02-01 | End: 2024-02-02 | Stop reason: HOSPADM

## 2024-02-01 RX ORDER — CLOPIDOGREL BISULFATE 75 MG/1
75 TABLET ORAL DAILY
Qty: 90 TABLET | Refills: 0 | Status: SHIPPED | OUTPATIENT
Start: 2024-02-02 | End: 2024-02-15 | Stop reason: SDUPTHER

## 2024-02-01 RX ORDER — ADENOSINE 3 MG/ML
INJECTION, SOLUTION INTRAVENOUS
Status: COMPLETED
Start: 2024-02-01 | End: 2024-02-01

## 2024-02-01 RX ORDER — ROSUVASTATIN CALCIUM 10 MG/1
10 TABLET, COATED ORAL EVERY EVENING
Qty: 90 TABLET | Refills: 0 | Status: SHIPPED | OUTPATIENT
Start: 2024-02-01 | End: 2024-02-15 | Stop reason: SDUPTHER

## 2024-02-01 RX ORDER — LISINOPRIL 40 MG/1
40 TABLET ORAL DAILY
Qty: 90 TABLET | Refills: 0 | Status: SHIPPED | OUTPATIENT
Start: 2024-02-02 | End: 2024-02-15 | Stop reason: SDUPTHER

## 2024-02-01 RX ORDER — SODIUM CHLORIDE 9 MG/ML
3 INJECTION, SOLUTION INTRAVENOUS CONTINUOUS
Status: DISCONTINUED | OUTPATIENT
Start: 2024-02-01 | End: 2024-02-01

## 2024-02-01 RX ORDER — PHENYLEPHRINE HCL IN 0.9% NACL 0.5 MG/5ML
SYRINGE (ML) INTRAVENOUS
Status: COMPLETED
Start: 2024-02-01 | End: 2024-02-01

## 2024-02-01 RX ORDER — CARVEDILOL 12.5 MG/1
12.5 TABLET ORAL 2 TIMES DAILY WITH MEALS
Qty: 180 TABLET | Refills: 0 | Status: SHIPPED | OUTPATIENT
Start: 2024-02-01 | End: 2024-02-15 | Stop reason: SDUPTHER

## 2024-02-01 RX ORDER — CLOPIDOGREL BISULFATE 75 MG/1
75 TABLET ORAL DAILY
Status: DISCONTINUED | OUTPATIENT
Start: 2024-02-02 | End: 2024-02-02 | Stop reason: HOSPADM

## 2024-02-01 RX ORDER — VERAPAMIL HYDROCHLORIDE 2.5 MG/ML
INJECTION, SOLUTION INTRAVENOUS
Status: COMPLETED
Start: 2024-02-01 | End: 2024-02-01

## 2024-02-01 RX ORDER — VANCOMYCIN HYDROCHLORIDE 125 MG/1
CAPSULE ORAL
Qty: 70 CAPSULE | Refills: 0 | Status: ACTIVE | OUTPATIENT
Start: 2024-02-01 | End: 2024-03-14

## 2024-02-01 RX ORDER — MIDAZOLAM HYDROCHLORIDE 1 MG/ML
INJECTION INTRAMUSCULAR; INTRAVENOUS
Status: COMPLETED
Start: 2024-02-01 | End: 2024-02-01

## 2024-02-01 RX ORDER — ASPIRIN 81 MG/1
81 TABLET ORAL DAILY
Qty: 90 TABLET | Refills: 0 | Status: SHIPPED | OUTPATIENT
Start: 2024-02-02 | End: 2024-02-15 | Stop reason: SDUPTHER

## 2024-02-01 RX ORDER — HEPARIN SODIUM 200 [USP'U]/100ML
INJECTION, SOLUTION INTRAVENOUS
Status: COMPLETED
Start: 2024-02-01 | End: 2024-02-01

## 2024-02-01 RX ORDER — CLOPIDOGREL 300 MG/1
600 TABLET, FILM COATED ORAL ONCE
Status: DISCONTINUED | OUTPATIENT
Start: 2024-02-01 | End: 2024-02-01

## 2024-02-01 RX ORDER — VANCOMYCIN HYDROCHLORIDE 125 MG/1
CAPSULE ORAL
Qty: 40 CAPSULE | Refills: 0 | Status: ACTIVE | OUTPATIENT
Start: 2024-02-01 | End: 2024-02-01

## 2024-02-01 RX ORDER — CLOPIDOGREL 300 MG/1
TABLET, FILM COATED ORAL
Status: COMPLETED
Start: 2024-02-01 | End: 2024-02-01

## 2024-02-01 RX ORDER — CARVEDILOL 12.5 MG/1
12.5 TABLET ORAL 2 TIMES DAILY WITH MEALS
Status: DISCONTINUED | OUTPATIENT
Start: 2024-02-01 | End: 2024-02-02 | Stop reason: HOSPADM

## 2024-02-01 RX ADMIN — FENTANYL CITRATE 100 MCG: 50 INJECTION, SOLUTION INTRAMUSCULAR; INTRAVENOUS at 11:08

## 2024-02-01 RX ADMIN — OXYCODONE 5 MG: 5 TABLET ORAL at 23:01

## 2024-02-01 RX ADMIN — ACETAMINOPHEN 650 MG: 325 TABLET, FILM COATED ORAL at 17:18

## 2024-02-01 RX ADMIN — HEPARIN SODIUM: 1000 INJECTION, SOLUTION INTRAVENOUS; SUBCUTANEOUS at 10:31

## 2024-02-01 RX ADMIN — SODIUM CHLORIDE, POTASSIUM CHLORIDE, SODIUM LACTATE AND CALCIUM CHLORIDE: 600; 310; 30; 20 INJECTION, SOLUTION INTRAVENOUS at 09:12

## 2024-02-01 RX ADMIN — MIDAZOLAM 2 MG: 1 INJECTION INTRAMUSCULAR; INTRAVENOUS at 10:30

## 2024-02-01 RX ADMIN — HEPARIN SODIUM: 1000 INJECTION, SOLUTION INTRAVENOUS; SUBCUTANEOUS at 11:44

## 2024-02-01 RX ADMIN — VANCOMYCIN HYDROCHLORIDE 125 MG: 5 INJECTION, POWDER, LYOPHILIZED, FOR SOLUTION INTRAVENOUS at 17:19

## 2024-02-01 RX ADMIN — OXYCODONE 2.5 MG: 5 TABLET ORAL at 20:07

## 2024-02-01 RX ADMIN — VANCOMYCIN HYDROCHLORIDE 125 MG: 5 INJECTION, POWDER, LYOPHILIZED, FOR SOLUTION INTRAVENOUS at 00:15

## 2024-02-01 RX ADMIN — SODIUM CHLORIDE 3 ML/KG/HR: 9 INJECTION, SOLUTION INTRAVENOUS at 12:30

## 2024-02-01 RX ADMIN — NITROGLYCERIN 0.4 MG: 0.4 TABLET, ORALLY DISINTEGRATING SUBLINGUAL at 13:23

## 2024-02-01 RX ADMIN — ADENOSINE 90 MG: 90 INJECTION, SOLUTION INTRAVENOUS at 11:09

## 2024-02-01 RX ADMIN — ASPIRIN 81 MG: 81 TABLET, COATED ORAL at 05:32

## 2024-02-01 RX ADMIN — VERAPAMIL HYDROCHLORIDE 2.5 MG: 2.5 INJECTION, SOLUTION INTRAVENOUS at 10:30

## 2024-02-01 RX ADMIN — OXYCODONE 5 MG: 5 TABLET ORAL at 05:33

## 2024-02-01 RX ADMIN — HEPARIN SODIUM: 1000 INJECTION, SOLUTION INTRAVENOUS; SUBCUTANEOUS at 11:10

## 2024-02-01 RX ADMIN — OXYCODONE 2.5 MG: 5 TABLET ORAL at 09:06

## 2024-02-01 RX ADMIN — VANCOMYCIN HYDROCHLORIDE 125 MG: 5 INJECTION, POWDER, LYOPHILIZED, FOR SOLUTION INTRAVENOUS at 05:34

## 2024-02-01 RX ADMIN — VANCOMYCIN HYDROCHLORIDE 125 MG: 5 INJECTION, POWDER, LYOPHILIZED, FOR SOLUTION INTRAVENOUS at 12:55

## 2024-02-01 RX ADMIN — IOHEXOL 104 ML: 350 INJECTION, SOLUTION INTRAVENOUS at 11:36

## 2024-02-01 RX ADMIN — LISINOPRIL 20 MG: 20 TABLET ORAL at 05:32

## 2024-02-01 RX ADMIN — CARVEDILOL 12.5 MG: 12.5 TABLET, FILM COATED ORAL at 07:50

## 2024-02-01 RX ADMIN — OXYCODONE 2.5 MG: 5 TABLET ORAL at 13:24

## 2024-02-01 RX ADMIN — ROSUVASTATIN 10 MG: 10 TABLET, FILM COATED ORAL at 17:18

## 2024-02-01 RX ADMIN — CARVEDILOL 12.5 MG: 12.5 TABLET, FILM COATED ORAL at 17:04

## 2024-02-01 RX ADMIN — VANCOMYCIN HYDROCHLORIDE 125 MG: 5 INJECTION, POWDER, LYOPHILIZED, FOR SOLUTION INTRAVENOUS at 23:01

## 2024-02-01 RX ADMIN — HEPARIN SODIUM 2000 UNITS: 200 INJECTION, SOLUTION INTRAVENOUS at 10:31

## 2024-02-01 RX ADMIN — CLOPIDOGREL BISULFATE 600 MG: 300 TABLET, FILM COATED ORAL at 11:33

## 2024-02-01 RX ADMIN — NITROGLYCERIN 10 ML: 20 INJECTION INTRAVENOUS at 10:30

## 2024-02-01 RX ADMIN — OXYCODONE 2.5 MG: 5 TABLET ORAL at 17:03

## 2024-02-01 RX ADMIN — LIDOCAINE HYDROCHLORIDE: 20 INJECTION, SOLUTION INFILTRATION; PERINEURAL at 10:29

## 2024-02-01 ASSESSMENT — ENCOUNTER SYMPTOMS
NAUSEA: 0
DIARRHEA: 0
NECK PAIN: 0
ARTHRALGIAS: 0
COUGH: 0
WEAKNESS: 0
WEIGHT LOSS: 0
DIARRHEA: 1
CHILLS: 0
DIAPHORESIS: 0
ORTHOPNEA: 0
LIGHT-HEADEDNESS: 0
ABDOMINAL PAIN: 0
VOMITING: 0
FATIGUE: 0
BLURRED VISION: 0
ABDOMINAL DISTENTION: 0
PHOTOPHOBIA: 0
PSYCHIATRIC NEGATIVE: 1
SHORTNESS OF BREATH: 0
EYES NEGATIVE: 1
COLOR CHANGE: 0
SPEECH CHANGE: 0
CHEST TIGHTNESS: 0
NERVOUS/ANXIOUS: 1
HEMOPTYSIS: 0
BRUISES/BLEEDS EASILY: 0
CONSTIPATION: 0
ABDOMINAL PAIN: 1
CLAUDICATION: 0
MYALGIAS: 0
DIZZINESS: 0
PALPITATIONS: 0
ENDOCRINE NEGATIVE: 1
FEVER: 0

## 2024-02-01 ASSESSMENT — PAIN DESCRIPTION - PAIN TYPE
TYPE: CHRONIC PAIN

## 2024-02-01 ASSESSMENT — FIBROSIS 4 INDEX
FIB4 SCORE: 1.53
FIB4 SCORE: 1.42

## 2024-02-01 NOTE — PROGRESS NOTES
Cardiology Follow Up Progress Note    Date of Service  2/1/2024    Attending Physician  Guanako Kimball M.D.    Chief Complaint   Chest pain    NICO Guzmán is a 68 y.o. male with a history of mitral valve repair and post op afib admitted 1/28/2024 with NSTEMI.     Interim Events  No cardiac events overnight, remains on heparin drip. Patient NPO this morning, plan for LHC today, procedure delayed due to emergency yesterday.    Review of Systems  Review of Systems   Constitutional:  Negative for chills, diaphoresis, fatigue and fever.   HENT: Negative.     Eyes: Negative.    Respiratory:  Negative for cough, chest tightness and shortness of breath.    Cardiovascular:  Negative for chest pain, palpitations and leg swelling.   Gastrointestinal:  Positive for diarrhea. Negative for abdominal distention, abdominal pain, constipation, nausea and vomiting.   Endocrine: Negative.    Genitourinary:  Negative for decreased urine volume, difficulty urinating, dysuria, frequency and urgency.   Musculoskeletal:  Negative for arthralgias and myalgias.   Skin:  Negative for color change.   Neurological:  Negative for dizziness, syncope and light-headedness.   Hematological:  Does not bruise/bleed easily.   Psychiatric/Behavioral: Negative.         Vital signs in last 24 hours  Temp:  [36.4 °C (97.5 °F)-36.9 °C (98.4 °F)] 36.5 °C (97.7 °F)  Pulse:  [60-65] 63  Resp:  [15-18] 15  BP: (130-178)/(72-96) 162/91  SpO2:  [91 %-98 %] 92 %    Physical Exam  Physical Exam  Vitals and nursing note reviewed.   Constitutional:       General: He is not in acute distress.     Appearance: Normal appearance. He is not toxic-appearing.   HENT:      Head: Normocephalic and atraumatic.      Right Ear: External ear normal.      Left Ear: External ear normal.      Nose: Nose normal.      Mouth/Throat:      Mouth: Mucous membranes are moist.      Pharynx: Oropharynx is clear.   Eyes:      General: No scleral icterus.     Extraocular Movements:  Extraocular movements intact.      Conjunctiva/sclera: Conjunctivae normal.      Pupils: Pupils are equal, round, and reactive to light.   Neck:      Vascular: No JVD.   Cardiovascular:      Rate and Rhythm: Normal rate and regular rhythm.      Pulses: Normal pulses.      Heart sounds: Normal heart sounds. No murmur heard.     No friction rub. No gallop.   Pulmonary:      Effort: Pulmonary effort is normal.      Breath sounds: Normal breath sounds.   Abdominal:      General: Abdomen is flat. Bowel sounds are normal. There is no distension.      Palpations: Abdomen is soft.      Tenderness: There is no abdominal tenderness.   Musculoskeletal:         General: Normal range of motion.      Cervical back: Normal range of motion and neck supple.      Right lower leg: No edema.      Left lower leg: No edema.   Skin:     General: Skin is warm and dry.      Capillary Refill: Capillary refill takes less than 2 seconds.      Coloration: Skin is not jaundiced.   Neurological:      General: No focal deficit present.      Mental Status: He is alert and oriented to person, place, and time. Mental status is at baseline.   Psychiatric:         Mood and Affect: Mood normal.         Behavior: Behavior normal.         Judgment: Judgment normal.         Lab Review  Lab Results   Component Value Date/Time    WBC 6.5 02/01/2024 04:21 AM    RBC 4.42 (L) 02/01/2024 04:21 AM    HEMOGLOBIN 13.4 (L) 02/01/2024 04:21 AM    HEMATOCRIT 39.2 (L) 02/01/2024 04:21 AM    MCV 88.7 02/01/2024 04:21 AM    MCH 30.3 02/01/2024 04:21 AM    MCHC 34.2 02/01/2024 04:21 AM    MPV 10.7 02/01/2024 04:21 AM      Lab Results   Component Value Date/Time    SODIUM 142 02/01/2024 04:21 AM    POTASSIUM 3.9 02/01/2024 04:21 AM    CHLORIDE 109 02/01/2024 04:21 AM    CO2 23 02/01/2024 04:21 AM    GLUCOSE 97 02/01/2024 04:21 AM    BUN 10 02/01/2024 04:21 AM    CREATININE 0.73 02/01/2024 04:21 AM      Lab Results   Component Value Date/Time    ASTSGOT 17 01/30/2024 03:06  "AM    ALTSGPT 27 01/30/2024 03:06 AM     Lab Results   Component Value Date/Time    CHOLSTRLTOT 240 (H) 01/29/2024 04:19 AM     (H) 01/29/2024 04:19 AM    HDL 73 01/29/2024 04:19 AM    TRIGLYCERIDE 120 01/29/2024 04:19 AM    TROPONINT 45 (H) 01/29/2024 10:38 AM       No results for input(s): \"NTPROBNP\" in the last 72 hours.      Cardiac Imaging and Procedures Review  EKG 1/29/2024  Sinus rhythm with t wave inversion anterolateral leads    Echocardiogram 1/28/2024  CONCLUSIONS  Normal left ventricular systolic function.  The left ventricular ejection fraction is visually estimated to be 55-  60%.  There is mild flattening of the interventricular septum.  Normal diastolic function.  The right ventricle is severely dilated with reduced systolic function.  Estimated right ventricular systolic pressure is 50 mmHg + right atrial   pressure.  The inferior vena cava is not well visualized.  Mild tricuspid regurgitation.    Cardiac Catheterization:  Pending    Assessment/Plan  #NSTEMI  #Dyslipidemia intolerant to lipitor  #History of mitral valve repair  #Cdiff     Recommendations:  - No current cardiac symptoms. Chest pain has improved after heparin gtt.  - No additional episodes of diarrhea, has been on oral vancomycin  - Previously intolerant to Lipitor. He is willing to try Crestor. If he develops myalgias, consider dosing as 3x weekly.   - Continue carvedilol and lisinopril, increased for higher BPs overnight, continue to titrate if needed  - Continue heparin gtt and aspirin   - LVEF 55-60% on echo  -Meds-to-beds on discharge  -Discussed CV risk factor modification including cholesterol management, blood pressure management, smoking cessation, diet and lifestyle changes.   - Cath was delayed yesterday due to emergent cath. Keep NPO. Plan for LHC later today.    Cardiology will continue to follow.    Thank you for allowing us to participate in the care of this patient.    Amita Morales, MSN, APRN  [facility]  [phone]    Amita VILLALBA A.P.R.N. performed a portion of the service face-to-face with the same  patient on the same date of service INDEPENDENTLY OF Dr. Draper FOR 15 MINUTES. I was  personally involved in reviewing and conducting elements of the history, exam and/or  medical decision making, including the information as described above.    Please note this dictation was created using voice recognition software.  I have made every reasonable attempt to correct obvious errors, but there may be errors of grammar and possibly content that I did not discover before finalizing the note.

## 2024-02-01 NOTE — DISCHARGE PLANNING
Case Management Discharge Planning    Admission Date: 1/28/2024  GMLOS: 2.4  ALOS: 4    6-Clicks ADL Score: 24  6-Clicks Mobility Score: 24      Anticipated Discharge Dispo: Discharge Disposition: Discharged to home/self care (01)    DME Needed: No    Action(s) Taken:   Pt discussed during IDT rounds. Pt not yet medically clear.     RICHY RN requested to verify if insurance would be able to cover PO Vancomycin. Per Pharmacy, insurance is not contacted with [facility]. Medication would need to be sent to an out patient pharmacy, such as [facility].    MD notified.     1515  RICHY RN met with pt at bedside to discuss discharge plan. Pt's preferred pharmacy is the [facility] located on [address], [place] [id number]  RICHY RN called [facility] ([phone]) to inquire on the price of PO vancomycin per MD request. Per pharmacist, they do not have the medication in stock and would not get it delivered until Monday.   MD notified.      RICHY RN contacted [facility] and inquired on the price of PO Vancomycin capsules if provided through meds-to-beds. Per pharmacist meds-to-beds price was quoted to be $41.49.   MD notified.   Per MD vancomycin PO to be delivered meds-to-beds and the rest of pt's medications are to be sent to the [facility] pharmacy in [place].     RICHY RN verified with pt that he can afford to pay out-of-pocket for this medication if meds-to-beds.   Pharmacy notified.     Escalations Completed: None    Medically Clear: No    Next Steps: RICHY RN to continue assisting with discharge planning as needed.       Barriers to Discharge: Medical clearance    Is the patient up for discharge tomorrow: No

## 2024-02-01 NOTE — PROCEDURES
Interventional cardiology procedure note      Indication for procedure - non-ST elevation MI, pulmonary hypertension    Procedures performed  Left heart catheterization  Coronary angiogram  Right heart catheterization with vasodilator challenge using intravenous adenosine  IVUS guided PCI of distal RCA using 4.0 x 12 mm Synergy drug-eluting stent  IVUS guided PCI of distal LAD 3.0X12 mm Synergy drug eluting stent    Summary:  Two-vessel coronary artery disease, successful PCI.  Severe pulmonary arterial hypertension, no response to vasodilator    Findings:  Left main-long left main, no significant stenosis.  Left anterior descending artery-midportion is calcified with 50% stenosis after diagonal bifurcation, IVUS showed circumferential calcium but minimal luminal area is adequate 7-8 mm².  Distal LAD has focal 80% stenosis  Left circumflex artery-small left circumflex artery system, no significant stenosis in the marginal branch  Right coronary artery-large vessel, 95% stenosis in the distal portion.    Hemodynamics:  Aortic pressure 136 x 69 with a mean of 116  Heart rate 75 bpm  Pulmonary arterial pressure 79/27 with a mean of 44  Pulmonary capillary wedge pressure 12 mmHg  Right ventricular pressure 63/11 with a EDP 13  Right atrial pressure 11 mmHg  Left ventricular end-diastolic pressure 13 mmHg  Cardiac output 4.7 L/min  Cardiac index 2.33 L/min/m2 by thermodilution method.  Pulmonary vascular resistance 7 Wood units  [place] 4.73, favorable  With adenosine no significant response with pulmonary arterial pressures.      Patient was brought to cardiac catheterization laboratory.  Informed consent was obtained timeout was performed.  Patient was sterilely draped.  Right radial artery access was obtained using 6 Belgian sheath, right brachial vein access was obtained using 6 Belgian sheath.  Coronary angiogram was performed using JL 3.5, JR4 catheters.  Left heart catheterization was performed using JR4 catheter.  A  balloontipped Thatcher-Stacy catheter was used to perform right heart catheterization.  IV adenosine was given at  100 mics per minute, 200 mics per minute 2 minutes each, no response to vasodilator in regards to pulmonary hypertension.  After obtaining right heart pressures.  Thatcher-Stacy catheter was removed.  A AR-1 guide catheter was used to engage right coronary artery.  Run-through wire was used to cross the lesion.  Lesion was dilated using 3.0 x 15 mm NC balloon.  There is 95% stenosis, lesion length 10 mm, KEITH-3 flow.  Postintervention 0% stenosis, KEITH-3 flow, none high risk.  IVUS was performed average vessel diameter was 4 mm, there was also a distal lesion which was not significant by IVUS.  A 4.0 x 12 mm Synergy drug-eluting stent was placed in distal RCA.  This was postdilated using 4.0 x 8 mm NC balloon at 14 jaqueline pressures with good result.  Then guide catheter, guidewire was removed.    An EBU 3.5 guide catheter was used to engage left main, run-through wire was used to cross distal left anterior descending artery lesion, this was treated with 3.0 x 12 mm Synergy drug-eluting stent, postdilated using 3.0 x 8 mm Synergy drug-eluting stent.  Mid LAD gently dilated using the same balloon, IVUS was performed to assess mid LAD stenosis which had circumferential calcium but no significant stenosis by IVUS, so additional stent was not placed.      Patient tolerated the procedure well.  No immediate complications.  All the catheters, wires removed, both sheaths were removed, manual pressure on the vein, vas band on the artery was placed.    I supervised moderate sedation over a trained independent nursing staff.  Sedation start time 1023, end time 1130.    Contrast 105 cc  Specimens none  Blood loss less than 20 cc  Complications none.

## 2024-02-01 NOTE — CARE PLAN
The patient is Watcher - Medium risk of patient condition declining or worsening    Shift Goals  Clinical Goals: patient will maintain hemodyamic stability through end of shift  Patient Goals: go to cath lab today  Family Goals: updated on poc    Progress made toward(s) clinical / shift goals:  No bm this shift. BP high sys - requiring prn labetalol. heparin drip therapeutic. Patient calling appropriately. Resumed diet for dinner - unable to complete cath today. NPO after midnight for cath tomorrow.     Patient is not progressing towards the following goals:

## 2024-02-01 NOTE — CARE PLAN
The patient is Stable - Low risk of patient condition declining or worsening    Shift Goals  Clinical Goals: patient will maintain hemodyamic stability through end of shift  Patient Goals: go to cath lab today  Family Goals: updated on poc    Progress made toward(s) clinical / shift goals:    Problem: Knowledge Deficit - Standard  Goal: Patient and family/care givers will demonstrate understanding of plan of care, disease process/condition, diagnostic tests and medications  Description: Target End Date:  1-3 days or as soon as patient condition allows    Document in Patient Education    1.  Patient and family/caregiver oriented to unit, equipment, visitation policy and means for communicating concern  2.  Complete/review Learning Assessment  3.  Assess knowledge level of disease process/condition, treatment plan, diagnostic tests and medications  4.  Explain disease process/condition, treatment plan, diagnostic tests and medications  Outcome: Progressing     Problem: Psychosocial  Goal: Patient's level of anxiety will decrease  Description: Target End Date:  1-3 days or as soon as patient condition allows    1.  Collaborate with patient and family/caregiver to identify triggers and develop strategies to cope with anxiety  2.  Implement stimuli reduction, calming techniques  3.  Pharmacologic management per provider order  4.  Encourage patient/family/care giver participation  5.  Collaborate with interdisciplinary team including Psychologist or Behavioral Health Team as needed  Outcome: Progressing  Goal: Patient's ability to verbalize feelings about condition will improve  Description: Target End Date:  Prior to discharge or change in level of care    1.  Discuss coping with medical condition and its effects  2.  Encourage patient participation in care  3.  Encourage acknowledgement of body changes and accompanying emotions  4.  Perform depression screening  Outcome: Progressing  Goal: Patient's ability to  re-evaluate and adapt role responsibilities will improve  Description: Target End Date:  Prior to discharge or change in level of care    1.  Assess family support  2.  Encourage support system participation in care  3.  Encouraged verbalization of feelings regarding caregiver responsibilities  4.  Discuss changes in role and responsibilities caused by patient's condition  Outcome: Progressing     Problem: Communication  Goal: The ability to communicate needs accurately and effectively will improve  Description: Target End Date:  End of day 1    1.  Assess ability to communicate and understand  2.  Provide augmentative or alternative methods of communication devices  3.  Use /language line as appropriate  4.  Collaborate with Speech Therapy as needed  Outcome: Progressing     Problem: Hemodynamics  Goal: Patient's hemodynamics, fluid balance and neurologic status will be stable or improve  Description: Target End Date:  Prior to discharge or change in level of care    Document on Assessment and I/O flowsheet templates    1.  Monitor vital signs, pulse oximetry and cardiac monitor per provider order and/or policy  2.  Maintain blood pressure per provider order  3.  Hemodynamic monitoring per provider order  4.  Manage IV fluids and IV infusions  5.  Monitor intake and output  6.  Daily weights per unit policy or provider order  7.  Assess peripheral pulses and capillary refill  8.  Assess color and body temperature  9.  Position patient for maximum circulation/cardiac output  10. Monitor for signs/symptoms of excessive bleeding  11. Assess mental status, restlessness and changes in level of consciousness  12. Monitor temperature and report fever or hypothermia to provider immediately. Consideration of targeted temperature management.  Outcome: Progressing     Problem: Mobility  Goal: Patient's capacity to carry out activities will improve  Description: Target End Date:  Prior to discharge or change in  level of care    1.  Assess for barriers to mobility/activity  2.  Implement activity per interdisciplinary team recommendations  3.  Target activity level identified and patient/family/caregiver aware of goal  4.  Provide assistive devices  5.  Instruct patient/caregiver on proper use of assistive/adaptive devices  6.  Schedule activities and rest periods to decrease effects of fatigue  7.  Encourage mobilization to extent of ability  8.  Maintain proper body alignment  9.  Provide adequate pain management to allow progressive mobilization  10. Implement pace maker precautions as needed  Outcome: Progressing     Problem: Self Care  Goal: Patient will have the ability to perform ADLs independently or with assistance (bathe, groom, dress, toilet and feed)  Description: Target End Date:  Prior to discharge or change in level of care    Document on ADL flowsheet    1.  Assess the capability and level of deficiency to perform ADLs  2.  Encourage family/care giver involvement  3.  Provide assistive devices  4.  Consider PT/OT evaluations  5.  Maintain support, give positive feedback, encourage self-care allowing extra time and verbal cuing as needed  6.  Avoid doing something for patients they can do themselves, but provide assistance as needed  7.  Assist in anticipating/planning individual needs  8.  Collaborate with Case Management and  to meet discharge needs  Outcome: Progressing       Patient is not progressing towards the following goals:

## 2024-02-01 NOTE — PROGRESS NOTES
Telemetry Shift Summary     Rhythm: SR  HR: 64-80  Ectopy: O-PVC    Measurements: .22/.13/.44            Normal Values  Rhythm: SR  HR:   Measurements: 0.12-0.20/0.08-0.10/0.30-0.52

## 2024-02-02 ENCOUNTER — PHARMACY VISIT (OUTPATIENT)
Dept: PHARMACY | Facility: MEDICAL CENTER | Age: 69
End: 2024-02-02
Payer: COMMERCIAL

## 2024-02-02 VITALS
BODY MASS INDEX: 28.05 KG/M2 | HEIGHT: 69 IN | SYSTOLIC BLOOD PRESSURE: 158 MMHG | OXYGEN SATURATION: 95 % | RESPIRATION RATE: 18 BRPM | DIASTOLIC BLOOD PRESSURE: 79 MMHG | TEMPERATURE: 98.4 F | HEART RATE: 61 BPM | WEIGHT: 189.38 LBS

## 2024-02-02 LAB
ANION GAP SERPL CALC-SCNC: 11 MMOL/L (ref 7–16)
BUN SERPL-MCNC: 7 MG/DL (ref 8–22)
CALCIUM SERPL-MCNC: 9.1 MG/DL (ref 8.5–10.5)
CHLORIDE SERPL-SCNC: 103 MMOL/L (ref 96–112)
CO2 SERPL-SCNC: 24 MMOL/L (ref 20–33)
CREAT SERPL-MCNC: 0.78 MG/DL (ref 0.5–1.4)
ERYTHROCYTE [DISTWIDTH] IN BLOOD BY AUTOMATED COUNT: 45.1 FL (ref 35.9–50)
GFR SERPLBLD CREATININE-BSD FMLA CKD-EPI: 97 ML/MIN/1.73 M 2
GLUCOSE BLD STRIP.AUTO-MCNC: 157 MG/DL (ref 65–99)
GLUCOSE BLD STRIP.AUTO-MCNC: 95 MG/DL (ref 65–99)
GLUCOSE SERPL-MCNC: 101 MG/DL (ref 65–99)
HCT VFR BLD AUTO: 41.4 % (ref 42–52)
HGB BLD-MCNC: 14.2 G/DL (ref 14–18)
MCH RBC QN AUTO: 30.1 PG (ref 27–33)
MCHC RBC AUTO-ENTMCNC: 34.3 G/DL (ref 32.3–36.5)
MCV RBC AUTO: 87.9 FL (ref 81.4–97.8)
PLATELET # BLD AUTO: 159 K/UL (ref 164–446)
PMV BLD AUTO: 10.5 FL (ref 9–12.9)
POTASSIUM SERPL-SCNC: 3.7 MMOL/L (ref 3.6–5.5)
RBC # BLD AUTO: 4.71 M/UL (ref 4.7–6.1)
SODIUM SERPL-SCNC: 138 MMOL/L (ref 135–145)
WBC # BLD AUTO: 7.6 K/UL (ref 4.8–10.8)

## 2024-02-02 PROCEDURE — 99239 HOSP IP/OBS DSCHRG MGMT >30: CPT | Performed by: HOSPITALIST

## 2024-02-02 PROCEDURE — 700111 HCHG RX REV CODE 636 W/ 250 OVERRIDE (IP): Performed by: INTERNAL MEDICINE

## 2024-02-02 PROCEDURE — 97535 SELF CARE MNGMENT TRAINING: CPT

## 2024-02-02 PROCEDURE — A9270 NON-COVERED ITEM OR SERVICE: HCPCS | Performed by: HOSPITALIST

## 2024-02-02 PROCEDURE — 36415 COLL VENOUS BLD VENIPUNCTURE: CPT

## 2024-02-02 PROCEDURE — A9270 NON-COVERED ITEM OR SERVICE: HCPCS | Performed by: INTERNAL MEDICINE

## 2024-02-02 PROCEDURE — 700102 HCHG RX REV CODE 250 W/ 637 OVERRIDE(OP): Performed by: HOSPITALIST

## 2024-02-02 PROCEDURE — 85027 COMPLETE CBC AUTOMATED: CPT

## 2024-02-02 PROCEDURE — 82962 GLUCOSE BLOOD TEST: CPT

## 2024-02-02 PROCEDURE — 700102 HCHG RX REV CODE 250 W/ 637 OVERRIDE(OP): Performed by: INTERNAL MEDICINE

## 2024-02-02 PROCEDURE — RXMED WILLOW AMBULATORY MEDICATION CHARGE: Performed by: HOSPITALIST

## 2024-02-02 PROCEDURE — 700101 HCHG RX REV CODE 250: Performed by: INTERNAL MEDICINE

## 2024-02-02 PROCEDURE — 80048 BASIC METABOLIC PNL TOTAL CA: CPT

## 2024-02-02 RX ORDER — AMLODIPINE BESYLATE 5 MG/1
5 TABLET ORAL DAILY
Qty: 30 TABLET | Refills: 0 | Status: SHIPPED | OUTPATIENT
Start: 2024-02-03 | End: 2024-02-15 | Stop reason: SDUPTHER

## 2024-02-02 RX ORDER — AMLODIPINE BESYLATE 5 MG/1
5 TABLET ORAL
Status: DISCONTINUED | OUTPATIENT
Start: 2024-02-02 | End: 2024-02-02 | Stop reason: HOSPADM

## 2024-02-02 RX ADMIN — OXYCODONE 2.5 MG: 5 TABLET ORAL at 05:15

## 2024-02-02 RX ADMIN — VANCOMYCIN HYDROCHLORIDE 125 MG: 5 INJECTION, POWDER, LYOPHILIZED, FOR SOLUTION INTRAVENOUS at 05:15

## 2024-02-02 RX ADMIN — LISINOPRIL 40 MG: 20 TABLET ORAL at 05:15

## 2024-02-02 RX ADMIN — ASPIRIN 81 MG: 81 TABLET, COATED ORAL at 05:15

## 2024-02-02 RX ADMIN — CLOPIDOGREL BISULFATE 75 MG: 75 TABLET ORAL at 05:15

## 2024-02-02 RX ADMIN — CARVEDILOL 12.5 MG: 12.5 TABLET, FILM COATED ORAL at 07:30

## 2024-02-02 RX ADMIN — AMLODIPINE BESYLATE 5 MG: 5 TABLET ORAL at 07:30

## 2024-02-02 ASSESSMENT — COPD QUESTIONNAIRES
DO YOU EVER COUGH UP ANY MUCUS OR PHLEGM?: NO/ONLY WITH OCCASIONAL COLDS OR INFECTIONS
COPD SCREENING SCORE: 2
HAVE YOU SMOKED AT LEAST 100 CIGARETTES IN YOUR ENTIRE LIFE: NO/DON'T KNOW
DURING THE PAST 4 WEEKS HOW MUCH DID YOU FEEL SHORT OF BREATH: NONE/LITTLE OF THE TIME

## 2024-02-02 ASSESSMENT — PAIN DESCRIPTION - PAIN TYPE: TYPE: SURGICAL PAIN;CHRONIC PAIN;ACUTE PAIN

## 2024-02-02 ASSESSMENT — FIBROSIS 4 INDEX: FIB4 SCORE: 1.42

## 2024-02-02 NOTE — PROGRESS NOTES
Monitor Summary   SB 56- SR 67   Ectopy: Rare MF PVCs, Bigeminal, Trigeminal   .21/.13/.50

## 2024-02-02 NOTE — DISCHARGE INSTRUCTIONS
Diagnosis:  Acute Coronary Syndrome (ACS) is a diagnosis that encompasses cardiac-related chest pain and heart attack. ACS occurs when the blood flow to the heart muscle is severely reduced or cut off completely due to a slow process called atherosclerosis.  Atherosclerosis is a disease in which the coronary arteries become narrow from a buildup of fat, cholesterol, and other substances that combine to form plaque. If the plaque breaks, a blood clot will form and block the blood flow to the heart muscle. This lack of blood flow can cause damage or death to the heart muscle which is called a heart attack or Myocardial Infarction (MI). There are two different types of MIs:  ST Elevation Myocardial Infarction or STEMI (the most severe type of heart attack) and Non-ST Elevation Myocardial Infarction or NSTEMI.    Treatment Plan:  Cardiac Diet  - Low fat, low salt, low cholesterol   Cardiac Rehab  - Your doctor has ordered you a referral to [facility].  Call [phone] to schedule an appointment.  Attend my follow-up appointment with my Cardiologist.  Take my medications as prescribed by my doctor  Exercise daily  Lower my bad cholesterol and raise my good cholesterol, lower my blood pressure, and Reduce stress    Medications:  Certain medications are used to treat ACS.  Remember to always take medications as prescribed and never stop talking medications unless told by your doctor.    You have been prescribed the following medicatons:    Aspirin - Aspirin is used as a blood thinning medication and you will require this medication indefinitely.  Anti-platelet/blood thinner - Your Anti-platelet/Blood thinning medication is called Clopidogrel, and is used in combination with aspirin to prevent clots from forming in your heart and/or around your stent.  Your doctor will determine how long you need to be on this medicine.  Beta-Blocker - Beta-Blocker Carvedilol is used to lower blood pressure and heart rate, and/or helps your  heart heal after a heart attack.  Statin - Statin Rosuvastatin is used to lower cholesterol.  Angiotensin Converting Enzyme (ACE) Inhibitor - Angiotensin Converting Enzyme Inhibitor Lisinopril is used to lower blood pressure and treat heart failure.

## 2024-02-02 NOTE — PROGRESS NOTES
Discharge instructions given to patient at bedside, verbalizes understanding and states plans for follow-up with PCP. New and home medication review, post-discharge activity level and worsening of symptoms needing follow-up care discussed. Telemetry monitor and IV catheter removed. All belongings accounted for, all questions answered at this time. Patient escorted out via wheelchair and was picked up by [employer].

## 2024-02-02 NOTE — CARE PLAN
The patient is Stable - Low risk of patient condition declining or worsening    Shift Goals  Clinical Goals: abx  Patient Goals: d/c, updates, pain mgmt  Family Goals: chantal    Progress made toward(s) clinical / shift goals:      Problem: Knowledge Deficit - Standard  Goal: Patient and family/care givers will demonstrate understanding of plan of care, disease process/condition, diagnostic tests and medications  Outcome: Progressing     Problem: Psychosocial  Goal: Patient's level of anxiety will decrease  Outcome: Progressing     Problem: Hemodynamics  Goal: Patient's hemodynamics, fluid balance and neurologic status will be stable or improve  Outcome: Progressing     Problem: Fluid Volume  Goal: Fluid volume balance will be maintained  Outcome: Progressing     Problem: Nutrition  Goal: Patient's nutritional and fluid intake will be adequate or improve  Outcome: Progressing     Problem: Urinary Elimination  Goal: Establish and maintain regular urinary output  Outcome: Progressing     Problem: Bowel Elimination  Goal: Establish and maintain regular bowel function  Outcome: Progressing     Problem: Pain - Standard  Goal: Alleviation of pain or a reduction in pain to the patient’s comfort goal  Outcome: Progressing     Problem: Fall Risk  Goal: Patient will remain free from falls  Outcome: Progressing       Patient is not progressing towards the following goals:

## 2024-02-02 NOTE — THERAPY
"Physical Therapy Contact Note    Patient Name: Osman Guzmán  Age:  68 y.o., Sex:  male  Medical Record #: [id number]  Today's Date: 2/2/2024    PT cardiac rehab consult received. Admitted with NSTEMI and pulmonary HTN. Pt underwent cardiac cath with PCI to RCA and LAD; PMH of mitral valve repair.   Pt VERY receptive to cardiac rehab education regarding goals for home exercise program, extensive discussion around progression of home exercise (duration/intensity/RPE), use of wearable devices such as \"Fitbit\" or smart watch to monitor HR in the moment when performing cardiovascular and strength training. Discussed nutrition briefly and role of stress management for holistic approach to cardiac rehab post NSTEMI and introduced outpatient Cardiac Rehab through [facility] if accessible as pt lives rurally in [place], [place]. Pt appreciative of the thorough discussion and can greatly benefit from stress/anxiety management to improve overall health/wellness. No further acute PT needs at this time.      02/02/24 0936   Precautions   Precautions Cardiac Precautions (See Comments)   Comments s/p PCI to LAD and RCA   Vitals   O2 Delivery Device None - Room Air   Pain 0 - 10 Group   Therapist Pain Assessment Nurse Notified;0   Prior Living Situation   Prior Services Home-Independent   Housing / Facility   (travel trailer)   Steps Into Home 2   Steps In Home 0   Rail None   Equipment Owned None   Lives with - Patient's Self Care Capacity Alone and Able to Care For Self   Comments works as a  for the [employer]   Prior Level of Functional Mobility   Bed Mobility Independent   Transfer Status Independent   Ambulation Independent   Ambulation Distance community, regular gym go'er   Assistive Devices Used None   Stairs Independent   Interdisciplinary Plan of Care Collaboration   IDT Collaboration with  Nursing   Collaboration Comments Seen for PT cardiac rehab education. see contact note for details. Pt reports mobility is at " baseline, currently under Cdiff precautions and unable to ambulate long distances around unit.   Session Information   Date / Session Number  2/2- cardiac rehab education only

## 2024-02-02 NOTE — DISCHARGE PLANNING
Case Management Discharge Planning    Admission Date: 1/28/2024  GMLOS: 2.4  ALOS: 5    6-Clicks ADL Score: 24  6-Clicks Mobility Score: 24    Anticipated Discharge Dispo: Discharge Disposition: Discharged to home/self care (01)    DME Needed: No    Action(s) Taken: OMARI notified by MD to confirm Vancomycin price with [facility]. Per [facility], price for medication is $93.88. Pt notified MD, pt cannot afford the new price as original price given yesterday was $41.49. SW completed ASF for Vancomycin at $88.88 per [facility]. OMARI faxed form to [facility] and notified MD.     OMARI called [employer] for pt needing transport to [place] with pt's permission. Cab company could not confirm payment amount, did not pt would have to pre-pay prior to ride. OMARI spoke with pt at bedside. Per pt, he will pay, just wants to get home. OMARI spoke to cab company and relayed information to cab company. OMARI notified pt and discharge RN. Per cab company, they will call pt back to give him information on cab picking him up. Provided pt with cab company's number.     OMARI notified by RN that pt's card got declined. OMARI reached out to leadership pt needing transport and situation. SW to order [employer] ride for pt. OMARI reached out to [facility]  for assistance.    Escalations Completed: None    Medically Clear: Yes    Next Steps: F/U with medical team to discuss discharge needs and barriers.    Barriers to Discharge: None    Is the patient up for discharge tomorrow: No, today.

## 2024-02-02 NOTE — CARE PLAN
The patient is Stable - Low risk of patient condition declining or worsening    Shift Goals  Clinical Goals: discharge; prevent infection  Patient Goals: discharge  Family Goals: NADIYA    Progress made toward(s) clinical / shift goals:    Problem: Knowledge Deficit - Standard  Goal: Patient and family/care givers will demonstrate understanding of plan of care, disease process/condition, diagnostic tests and medications  Outcome: Progressing     Problem: Hemodynamics  Goal: Patient's hemodynamics, fluid balance and neurologic status will be stable or improve  Outcome: Progressing     Problem: Chest Tube Management  Goal: Complications related to chest tube will be avoided or minimized  Outcome: Progressing     Problem: Discharge Barriers/Planning  Goal: Patient's continuum of care needs are met  Outcome: Progressing     Problem: Nutrition  Goal: Patient's nutritional and fluid intake will be adequate or improve  Outcome: Progressing     Problem: Urinary Elimination  Goal: Establish and maintain regular urinary output  Outcome: Progressing     Problem: Bowel Elimination  Goal: Establish and maintain regular bowel function  Outcome: Progressing     Problem: Mobility  Goal: Patient's capacity to carry out activities will improve  Outcome: Progressing     Problem: Self Care  Goal: Patient will have the ability to perform ADLs independently or with assistance (bathe, groom, dress, toilet and feed)  Outcome: Progressing     Problem: Infection - Standard  Goal: Patient will remain free from infection  Outcome: Progressing     Problem: Pain - Standard  Goal: Alleviation of pain or a reduction in pain to the patient’s comfort goal  Outcome: Progressing     Problem: Fall Risk  Goal: Patient will remain free from falls  Outcome: Progressing       Patient is not progressing towards the following goals:

## 2024-02-02 NOTE — DISCHARGE SUMMARY
Discharge Summary    CHIEF COMPLAINT ON ADMISSION  No chief complaint on file.      Reason for Admission  NSTEMI     Admission Date  1/28/2024    CODE STATUS  Full Code    HPI & HOSPITAL COURSE    This is a 68-year-old male with a past medical history significant for placement 10 years ago presented on 1/28 with a complaint of chest pain/chest pressure.  EKG showed T wave inversion in lateral leads with mild elevated troponin thus was transferred to [facility] for cardiology consultation.  Prior to transfer, patient was started on ASA, heparin drip, statin, BB, lisinopril.    Cardiology consulted, patient underwent cardiac cath on 2/1/2024, he had stent in distal LAD and RCA.  Cardiology continue to follow during the stay in the hospital.  Cardiology recommended continuing aspirin and Plavix for 12 months followed by Plavix 75 mg p.o. daily, rosuvastatin 10 mg p.o. daily, lisinopril 40 mg p.o. daily, Coreg 12.5 mg p.o. twice daily.  Will follow-up with cardiology office as an outpatient.  Upon my evaluation, patient is chest pain-free.    Regarding CT, patient will continue oral vancomycin.  Oral vancomycin will be provided to [facility] to beds.  Rest of the medication will be provided through [facility].    For all the other  chronic medical condition, patient will resume his home medication.    Therefore, he is discharged in good and stable condition to home with close outpatient follow-up.    The patient met 2-midnight criteria for an inpatient stay at the time of discharge.    Discharge Date  2/2/2024      FOLLOW UP ITEMS POST DISCHARGE  Cardiology        DISCHARGE DIAGNOSES  Principal Problem:    NSTEMI (non-ST elevated myocardial infarction) (HCC) (POA: Yes)  Active Problems:    Prediabetes (POA: Yes)    Abnormal EKG (POA: Yes)    Elevated brain natriuretic peptide (BNP) level (POA: Yes)    Arrhythmia possible atrial fibrillation (POA: Yes)    History of mitral valve repair (POA: Unknown)    Diarrhea (POA:  Unknown)    Pulmonary hypertension (HCC) (POA: Unknown)    C. difficile colitis (POA: Unknown)  Resolved Problems:    * No resolved hospital problems. *      FOLLOW UP  Future Appointments   Date Time Provider Department Center   2/5/2024  1:40 PM GARRETT Padilla None     [facility]  [address]  [address]  [place] [address] [phone]  Call  Your doctor has referred you for Cardiac Rehab which is important in your recovery. Please call to make an appointment.      MEDICATIONS ON DISCHARGE     Medication List        START taking these medications        Instructions   aspirin 81 MG EC tablet   Take 1 Tablet by mouth every day for 90 days.  Dose: 81 mg     carvedilol 12.5 MG Tabs  Commonly known as: Coreg   Take 1 Tablet by mouth 2 times a day with meals for 90 days.  Dose: 12.5 mg     clopidogrel 75 MG Tabs  Commonly known as: Plavix   Take 1 Tablet by mouth every day for 90 days.  Dose: 75 mg     lisinopril 40 MG tablet  Commonly known as: Prinivil   Take 1 Tablet by mouth every day for 90 days.  Dose: 40 mg     rosuvastatin 10 MG Tabs  Commonly known as: Crestor   Take 1 Tablet by mouth every evening for 90 days.  Dose: 10 mg     vancomycin 125 MG capsule  Start taking on: February 1, 2024  Commonly known as: Vancocin   Take 1 Capsule by mouth every 6 hours for 10 days, THEN 1 Capsule every 12 hours for 7 days, THEN 1 Capsule every day for 7 days, THEN 1 Capsule every 48 hours for 7 days, THEN 1 Capsule every 3 days for 14 days.            CONTINUE taking these medications        Instructions   Medium Chain Triglycerides Oil  Commonly known as: MCT   Take 1 Each by mouth every day.  Dose: 1 Each     NICOTINAMIDE PO   Take 1 Tablet by mouth every day. Nicotinomide mononitrate -- natural supplement for Alzheimer's  Dose: 1 Tablet              Allergies  Allergies   Allergen Reactions    Lipitor [Atorvastatin]      Severe muscle crumpling          DIET  Orders Placed This  Encounter   Procedures    Diet Order Diet: Cardiac     Standing Status:   Standing     Number of Occurrences:   1     Order Specific Question:   Diet:     Answer:   Cardiac [6]       ACTIVITY  As tolerated.  Weight bearing as tolerated    CONSULTATIONS  Cardiology     PROCEDURES  Cardiac cath   wo-vessel coronary artery disease, successful PCI.  Severe pulmonary arterial hypertension, no response to vasodilator     Findings:  Left main-long left main, no significant stenosis.  Left anterior descending artery-midportion is calcified with 50% stenosis after diagonal bifurcation, IVUS showed circumferential calcium but minimal luminal area is adequate 7-8 mm².  Distal LAD has focal 80% stenosis  Left circumflex artery-small left circumflex artery system, no significant stenosis in the marginal branch  Right coronary artery-large vessel, 95% stenosis in the distal portion.    LABORATORY  Lab Results   Component Value Date    SODIUM 138 02/02/2024    POTASSIUM 3.7 02/02/2024    CHLORIDE 103 02/02/2024    CO2 24 02/02/2024    GLUCOSE 101 (H) 02/02/2024    BUN 7 (L) 02/02/2024    CREATININE 0.78 02/02/2024        Lab Results   Component Value Date    WBC 7.6 02/02/2024    HEMOGLOBIN 14.2 02/02/2024    HEMATOCRIT 41.4 (L) 02/02/2024    PLATELETCT 159 (L) 02/02/2024        Total time of the discharge process exceeds 39 minutes.

## 2024-02-02 NOTE — CARE PLAN
Problem: Psychosocial  Goal: Patient's level of anxiety will decrease  Outcome: Progressing     Problem: Fluid Volume  Goal: Fluid volume balance will be maintained  Outcome: Progressing     Problem: Nutrition  Goal: Patient's nutritional and fluid intake will be adequate or improve  Outcome: Progressing   The patient is Stable - Low risk of patient condition declining or worsening    Shift Goals  Clinical Goals: maintain hemodynamic stability; pain management  Patient Goals: Cath lab  Family Goals: NADIYA

## 2024-02-02 NOTE — PROGRESS NOTES
Hospital Medicine Daily Progress Note    Date of Service  2/1/2024    Chief Complaint  Osman Guzmán is a 68 y.o. male admitted 1/28/2024 with chest pain    Hospital Course    This is a 68-year-old male with a past medical history significant for placement 10 years ago presented on 1/28 with a complaint of chest pain/chest pressure.  EKG showed T wave inversion in lateral leads with mild elevated troponin thus was transferred to [facility] for cardiology consultation.  Prior to transfer, patient was started on ASA, heparin drip, statin, BB, lisinopril.    Cardiology consulted, plan for cardiac cath today, currently n.p.o. at midnight.  His hospital course was complicated  c-dif colitis, will continue oral vancomycin    Interval events:  -- No acute events overnight, medicine administered, patient saturating well on room air.  Patient denies any chest pain abdominal evaluation  --Current continue to follow, currently patient is on heparin drip.  Will monitor antigen every 6 hours and titrate heparin drip accordingly.  --Patient 3.5 replete, magnesium 1.7 replete  --Continue oral vancomycin    1/31:  -- No acute events overnight, patient blood pressure slightly evaluated; patient has been stable.    --Cardiology  continue to follow, currently patient n.p.o., will be going for cardiac cath  --Continue heparin drip  --K at 4 , monitor  -- Continue oral vancomycin for C. difficile, continue isolation per protocol.  Monitor bowel movements    Patient that he is chest pain free since he was started on heparin drip    2/1:  -- No acute events overnight, vital signs been stable, patient blood pressure is elevated.-States his blood pressure medication; currently lisinopril 40, Coreg 12.5 mg p.o. twice daily.  If his blood pressure continues to remain elevated, will increase Coreg to 25.  -Patient underwent cardiac cath; s/p to RCA and LAD; during right heart cath, patient noted to have precapillary pulmonary hypertension 80% 2019,  cardiology recommended following up with pulmonary as an outpatient.   -- Cultures recommended checking dual antibiotic therapy at least for 12 months followed by Plavix 75 mg p.o. daily rosuvastatin 10 mg p.o. daily lisinopril 40 mg Coreg 12.5 mg p.o. twice daily.    --Continue oral vancomycin for C. difficile.  -- Need to be oob tid with meals    I have discussed this patient's plan of care and discharge plan at IDT rounds today with Case Management, Nursing, Nursing leadership, and other members of the IDT team.    Consultants/Specialty  cardiology    Code Status  Full Code    Disposition  The patient is not medically cleared for discharge to home or a post-acute facility.  Anticipate discharge to: home with organized home healthcare and close outpatient follow-up    I have placed the appropriate orders for post-discharge needs.    Review of Systems  Review of Systems   Constitutional:  Negative for weight loss.   HENT:  Negative for congestion.    Eyes:  Negative for blurred vision and photophobia.   Respiratory:  Negative for cough and hemoptysis.    Cardiovascular:  Negative for chest pain, orthopnea and claudication.   Gastrointestinal:  Positive for abdominal pain. Negative for constipation, diarrhea and nausea.   Genitourinary:  Negative for dysuria.   Musculoskeletal:  Negative for myalgias and neck pain.   Neurological:  Negative for dizziness, speech change and weakness.   Psychiatric/Behavioral:  The patient is nervous/anxious.    All other systems reviewed and are negative.       Physical Exam  Temp:  [36.2 °C (97.2 °F)-36.9 °C (98.4 °F)] 36.6 °C (97.9 °F)  Pulse:  [55-68] 67  Resp:  [15-17] 17  BP: (130-181)/(71-94) 166/88  SpO2:  [90 %-97 %] 94 %    Physical Exam  Vitals and nursing note reviewed.   Constitutional:       Appearance: Normal appearance. He is not ill-appearing.   HENT:      Head: Normocephalic and atraumatic.   Eyes:      Extraocular Movements: Extraocular movements intact.       Pupils: Pupils are equal, round, and reactive to light.   Cardiovascular:      Rate and Rhythm: Normal rate.   Pulmonary:      Effort: No respiratory distress.      Breath sounds: No wheezing.   Abdominal:      General: There is no distension.      Tenderness: There is no abdominal tenderness.   Musculoskeletal:      Right lower leg: No edema.      Left lower leg: No edema.   Lymphadenopathy:      Cervical: No cervical adenopathy.   Skin:     Coloration: Skin is not jaundiced.   Neurological:      Mental Status: He is alert and oriented to person, place, and time. Mental status is at baseline.      Cranial Nerves: No cranial nerve deficit.      Motor: No weakness.      Gait: Gait normal.   Psychiatric:         Mood and Affect: Mood normal.         Fluids    Intake/Output Summary (Last 24 hours) at 2/1/2024 1624  Last data filed at 2/1/2024 1423  Gross per 24 hour   Intake --   Output 450 ml   Net -450 ml         Laboratory  Recent Labs     01/30/24  0306 01/31/24  0127 02/01/24  0421   WBC 9.2 7.3 6.5   RBC 4.98 4.64* 4.42*   HEMOGLOBIN 15.0 14.4 13.4*   HEMATOCRIT 45.3 41.6* 39.2*   MCV 91.0 89.7 88.7   MCH 30.1 31.0 30.3   MCHC 33.1 34.6 34.2   RDW 50.2* 47.9 46.3   PLATELETCT 177 143* 154*   MPV 11.4 10.9 10.7       Recent Labs     01/30/24  0306 01/31/24  0127 02/01/24  0421   SODIUM 138 138 142   POTASSIUM 3.5* 4.0 3.9   CHLORIDE 105 108 109   CO2 22 21 23   GLUCOSE 109* 98 97   BUN 13 12 10   CREATININE 0.74 0.73 0.73   CALCIUM 9.1 8.5 8.5                         Imaging  CT-CTA CHEST PULMONARY ARTERY W/ RECONS   Final Result      1.  No CT evidence of pulmonary embolism.      2.  There is calcific atherosclerosis including the coronary arteries.            EC-ECHOCARDIOGRAM COMPLETE W/O CONT   Final Result      CL-LEFT HEART CATHETERIZATION WITH POSSIBLE INTERVENTION    (Results Pending)        Assessment/Plan  * NSTEMI (non-ST elevated myocardial infarction) (HCC)- (present on admission)  Assessment &  Plan    Pressure chest pain with this changes on EKG and mildly elevated troponin  Started with heparin drip, aspirin and rosuvastatin, acei and bb    -- echo reviewed, cards called and planning for cardiac cath    --npo now, will be going for cardiac cath    On 1/30, cardiac cath could not complete due to complicated emergency  2/1: Underwent cardiac cath, had PCI done in distal LAD and RCA    C. difficile colitis  Assessment & Plan  Patient developed diarrhea after admission in around 12 hours  Mild leukocytosis and no kidney failure  C. difficile test was positive  Vancomycin orally was started on 1/29; will continue for a total of 10 days    Pulmonary hypertension (HCC)  Assessment & Plan  Echo showed normal ejection fraction with right ventricular systolic pressure  CTA didn't show pe  -- Patient underwent right heart cath, found to have precapillary pulmonary hypertension. Need to follow-up with patient as an outpatient    Diarrhea  Assessment & Plan  C-dif +ve   Continue oral vanco    -- monitor BM   -- isolation per protocol    History of mitral valve repair  Assessment & Plan  More than 10 years ago  Echo reviewd    Arrhythmia possible atrial fibrillation- (present on admission)  Assessment & Plan  No history of atrial fibrillation  EKG and telemetry shows sinus rhythm  On heparin gtt    -- cards following     Elevated brain natriuretic peptide (BNP) level- (present on admission)  Assessment & Plan  Echo reviewed and noted to have moderate pulm htn    Abnormal EKG- (present on admission)  Assessment & Plan  ST depression and T inversion on lateral leads   continue aspirin with heparin drip    Cardiac cath s/p stent on LAD and RCA    Prediabetes- (present on admission)  Assessment & Plan  A1c 5.9  Encouraged the patient for healthy diet           VTE prophylaxis: lovenox    I have performed a physical exam and reviewed and updated ROS and Plan today (2/1/2024). In review of yesterday's note (1/31/2024),  there are no changes except as documented above.

## 2024-02-15 ENCOUNTER — OFFICE VISIT (OUTPATIENT)
Dept: CARDIOLOGY | Facility: MEDICAL CENTER | Age: 69
End: 2024-02-15
Attending: HOSPITALIST
Payer: MEDICARE

## 2024-02-15 VITALS
WEIGHT: 198 LBS | HEART RATE: 74 BPM | BODY MASS INDEX: 29.33 KG/M2 | HEIGHT: 69 IN | OXYGEN SATURATION: 98 % | DIASTOLIC BLOOD PRESSURE: 68 MMHG | SYSTOLIC BLOOD PRESSURE: 154 MMHG

## 2024-02-15 DIAGNOSIS — I25.10 CORONARY ARTERY DISEASE INVOLVING NATIVE CORONARY ARTERY OF NATIVE HEART WITHOUT ANGINA PECTORIS: ICD-10-CM

## 2024-02-15 DIAGNOSIS — R06.09 DOE (DYSPNEA ON EXERTION): ICD-10-CM

## 2024-02-15 DIAGNOSIS — R06.09 OTHER FORMS OF DYSPNEA: ICD-10-CM

## 2024-02-15 DIAGNOSIS — I27.20 PULMONARY HYPERTENSION (HCC): ICD-10-CM

## 2024-02-15 DIAGNOSIS — I21.4 NSTEMI (NON-ST ELEVATED MYOCARDIAL INFARCTION) (HCC): ICD-10-CM

## 2024-02-15 DIAGNOSIS — Z98.890 HISTORY OF MITRAL VALVE REPAIR: ICD-10-CM

## 2024-02-15 DIAGNOSIS — Z95.5 STATUS POST CORONARY ARTERY STENT PLACEMENT: ICD-10-CM

## 2024-02-15 DIAGNOSIS — R79.89 ELEVATED BRAIN NATRIURETIC PEPTIDE (BNP) LEVEL: ICD-10-CM

## 2024-02-15 PROCEDURE — 94618 PULMONARY STRESS TESTING: CPT | Performed by: NURSE PRACTITIONER

## 2024-02-15 PROCEDURE — 3078F DIAST BP <80 MM HG: CPT | Performed by: NURSE PRACTITIONER

## 2024-02-15 PROCEDURE — 94618 PULMONARY STRESS TESTING: CPT | Mod: 26 | Performed by: NURSE PRACTITIONER

## 2024-02-15 PROCEDURE — 99214 OFFICE O/P EST MOD 30 MIN: CPT | Mod: 25 | Performed by: NURSE PRACTITIONER

## 2024-02-15 PROCEDURE — 3077F SYST BP >= 140 MM HG: CPT | Performed by: NURSE PRACTITIONER

## 2024-02-15 PROCEDURE — 99213 OFFICE O/P EST LOW 20 MIN: CPT | Mod: 25 | Performed by: NURSE PRACTITIONER

## 2024-02-15 RX ORDER — CLOPIDOGREL BISULFATE 75 MG/1
75 TABLET ORAL DAILY
Qty: 90 TABLET | Refills: 3 | Status: SHIPPED | OUTPATIENT
Start: 2024-02-15

## 2024-02-15 RX ORDER — NITROGLYCERIN 0.4 MG/1
0.4 TABLET SUBLINGUAL PRN
Qty: 25 TABLET | Refills: 5 | Status: SHIPPED | OUTPATIENT
Start: 2024-02-15

## 2024-02-15 RX ORDER — FUROSEMIDE 20 MG/1
20 TABLET ORAL DAILY
Qty: 90 TABLET | Refills: 3 | Status: SHIPPED | OUTPATIENT
Start: 2024-02-15 | End: 2024-03-14

## 2024-02-15 RX ORDER — AMLODIPINE BESYLATE 5 MG/1
5 TABLET ORAL DAILY
Qty: 90 TABLET | Refills: 3 | Status: SHIPPED | OUTPATIENT
Start: 2024-02-15 | End: 2024-03-14

## 2024-02-15 RX ORDER — CARVEDILOL 12.5 MG/1
12.5 TABLET ORAL 2 TIMES DAILY WITH MEALS
Qty: 180 TABLET | Refills: 3 | Status: SHIPPED | OUTPATIENT
Start: 2024-02-15

## 2024-02-15 RX ORDER — LISINOPRIL 40 MG/1
40 TABLET ORAL DAILY
Qty: 90 TABLET | Refills: 3 | Status: SHIPPED | OUTPATIENT
Start: 2024-02-15

## 2024-02-15 RX ORDER — POTASSIUM CHLORIDE 20 MEQ/1
20 TABLET, EXTENDED RELEASE ORAL DAILY
Qty: 90 TABLET | Refills: 3 | Status: SHIPPED | OUTPATIENT
Start: 2024-02-15 | End: 2024-03-14

## 2024-02-15 RX ORDER — ROSUVASTATIN CALCIUM 10 MG/1
10 TABLET, COATED ORAL EVERY EVENING
Qty: 90 TABLET | Refills: 3 | Status: SHIPPED | OUTPATIENT
Start: 2024-02-15

## 2024-02-15 RX ORDER — ASPIRIN 81 MG/1
81 TABLET ORAL DAILY
Qty: 90 TABLET | Refills: 3 | Status: SHIPPED | OUTPATIENT
Start: 2024-02-15

## 2024-02-15 ASSESSMENT — 6 MINUTE WALK TEST (6MWT): TOTAL DISTANCE WALKED (METERS): 321.63

## 2024-02-15 ASSESSMENT — FIBROSIS 4 INDEX: FIB4 SCORE: 1.38

## 2024-02-15 NOTE — PATIENT INSTRUCTIONS
Checking Blood Pressure:  -Blood pressure cuff, spend in the $40-65, with good return policy  -It should be automatic, upper arm, measure your arm to get the correct size, probably adult Large  -Put the cuff in place, rest arm on table near height of your heart, sit quietly for 5 min, legs uncrossed, with back support, then take your blood pressure, write it down, keep a log  -Check once a day. Ideally, 2 hours after medications.  -Can bring your cuff to at least one appointment where it can be calibrated to a manual cuff if you are concerned.  -Goal blood pressure is at least under 130/80, ideally under 120/80.  If you think your BP is overall too high, let us know in the office, we can adjust medications, can use [employer] or call the "[facility] office: [phone].  -If you feel dizzy, please check your blood pressure.  If your blood pressure is less than 90/60 with dizziness call our office at [phone].    -Continue to monitor blood pressures, heart rates, and daily weights in log provided in office today. Please bring to next appointment to review with provider.     Salt=sodium=sea salt, guidelines say stay under 2,500 mg daily   Get salt smart, start looking at labels, count it up.  Salt is hidden in everything, salad dressing, sauces, cheese, most canned food, any processed meat.

## 2024-02-15 NOTE — PROGRESS NOTES
Chief Complaint   Patient presents with    Atrial Fibrillation     Dx:Arrhythmia possible atrial fibrillation    Hypertension       Subjective     Osman Guzmán is a 68 y.o. male who presents today for hospital follow-up after NSTEMI.  Patient has additional medical history mitral valve repair, asthma, statin intolerance.  During patient's hospitalization he was incidentally found to precapillary pulmonary hypertension.    Today, patient reports continues to experience MARTINEZ with NYHA class II functional capacity.  Patient reports over the last month he has gained approximately 10 pounds not related to diet.  Patient reports history of sleep apnea workup 7 years ago in [facility].  Otherwise, Patient denies chest pain, shortness of breath at rest, palpitations, dizziness/lightheadedness, orthopnea, PND or Edema.     We will optimize GDMT per below.  Chest CT reviewed per below.  We will obtain pulmonary function testings and refer patient to pulmonary.  Will add gentle diuresis with potassium supplementation with reevaluation of renal electrolyte function in 2 weeks for high risk medication use.  We will refer patient to lipid clinic for history of statin intolerance.  We will also check his vitamin D levels.  Patient to follow-up with cardiology in 1 month.    Initial 6 minute walk test, patient was able to complete 321 m during 6 minute walk test.  O2 saturation at baseline was 98% and at the end of the test, the O2 saturation was 97%. He reported level 5 of dyspnea on Stephanie scale.  Patient was able to walk for 6 minutes.      Past Medical History:   Diagnosis Date    History of mitral valve repair      History reviewed. No pertinent surgical history.  History reviewed. No pertinent family history.  Social History     Socioeconomic History    Marital status: Single     Spouse name: Not on file    Number of children: Not on file    Years of education: Not on file    Highest education level: Not on file    Occupational History    Not on file   Tobacco Use    Smoking status: Never    Smokeless tobacco: Never   Substance and Sexual Activity    Alcohol use: Not Currently    Drug use: Never    Sexual activity: Not on file   Other Topics Concern    Not on file   Social History Narrative    Not on file     Social Determinants of Health     Financial Resource Strain: Not on file   Food Insecurity: Not on file   Transportation Needs: Not on file   Physical Activity: Not on file   Stress: Not on file   Social Connections: Not on file   Intimate Partner Violence: Not on file   Housing Stability: Not on file     Allergies   Allergen Reactions    Lipitor [Atorvastatin]      Severe muscle crumpling        Outpatient Encounter Medications as of 2/15/2024   Medication Sig Dispense Refill    furosemide (LASIX) 20 MG Tab Take 1 Tablet by mouth every day. 90 Tablet 3    potassium chloride SA (KDUR) 20 MEQ Tab CR Take 1 Tablet by mouth every day. 90 Tablet 3    amLODIPine (NORVASC) 5 MG Tab Take 1 Tablet by mouth every day. 90 Tablet 3    carvedilol (COREG) 12.5 MG Tab Take 1 Tablet by mouth 2 times a day with meals. 180 Tablet 3    clopidogrel (PLAVIX) 75 MG Tab Take 1 Tablet by mouth every day. 90 Tablet 3    aspirin 81 MG EC tablet Take 1 Tablet by mouth every day. 90 Tablet 3    lisinopril (PRINIVIL) 40 MG tablet Take 1 Tablet by mouth every day. 90 Tablet 3    rosuvastatin (CRESTOR) 10 MG Tab Take 1 Tablet by mouth every evening. 90 Tablet 3    nitroglycerin (NITROSTAT) 0.4 MG SL Tab Place 1 Tablet under the tongue as needed for Chest Pain (every 5 minutes for persistent chest pain. After 3rd dose, call 911). 25 Tablet 5    vancomycin (VANCOCIN) 125 MG capsule Take 1 Capsule by mouth every 6 hours for 10 days, THEN 1 Capsule every 12 hours for 7 days, THEN 1 Capsule every day for 7 days, THEN 1 Capsule every 48 hours for 7 days, THEN 1 Capsule every 3 days for 14 days. 70 Capsule 0    Medium Chain Triglycerides (MCT) Oil Take 1  "Each by mouth every day.      [DISCONTINUED] amLODIPine (NORVASC) 5 MG Tab Take 1 Tablet by mouth every day. 30 Tablet 0    [DISCONTINUED] aspirin 81 MG EC tablet Take 1 Tablet by mouth every day for 90 days. 90 Tablet 0    [DISCONTINUED] carvedilol (COREG) 12.5 MG Tab Take 1 Tablet by mouth 2 times a day with meals for 90 days. 180 Tablet 0    [DISCONTINUED] clopidogrel (PLAVIX) 75 MG Tab Take 1 Tablet by mouth every day for 90 days. 90 Tablet 0    [DISCONTINUED] lisinopril (PRINIVIL) 40 MG tablet Take 1 Tablet by mouth every day for 90 days. 90 Tablet 0    [DISCONTINUED] rosuvastatin (CRESTOR) 10 MG Tab Take 1 Tablet by mouth every evening for 90 days. 90 Tablet 0    Niacinamide-Zn-Cu-Uojuwx-Ba-Vm (NICOTINAMIDE PO) Take 1 Tablet by mouth every day. Nicotinomide mononitrate -- natural supplement for Alzheimer's (Patient not taking: Reported on 2/15/2024)       No facility-administered encounter medications on file as of 2/15/2024.     ROS Complete review of systems negative except as noted in HPI/subjective           Objective     BP (!) 154/68 (BP Location: Left arm, Patient Position: Sitting, BP Cuff Size: Adult)   Pulse 74   Ht 1.753 m (5' 9\")   Wt 89.8 kg (198 lb)   SpO2 98%   BMI 29.24 kg/m²     Physical Exam  Vitals reviewed.   Constitutional:       Appearance: He is well-developed.   HENT:      Head: Normocephalic and atraumatic.   Eyes:      Pupils: Pupils are equal, round, and reactive to light.   Neck:      Vascular: No JVD.   Cardiovascular:      Rate and Rhythm: Normal rate and regular rhythm.      Heart sounds: Normal heart sounds. No murmur heard.     No friction rub. No gallop.   Pulmonary:      Effort: Pulmonary effort is normal. No respiratory distress.      Breath sounds: Normal breath sounds.   Abdominal:      General: Bowel sounds are normal. There is distension.      Palpations: Abdomen is soft.   Musculoskeletal:      Right lower leg: No edema.      Left lower leg: No edema.   Skin:     " General: Skin is warm and dry.      Findings: No erythema.   Neurological:      General: No focal deficit present.      Mental Status: He is alert and oriented to person, place, and time.   Psychiatric:         Behavior: Behavior normal.            Lab Results   Component Value Date/Time    CHOLSTRLTOT 240 (H) 01/29/2024 04:19 AM     (H) 01/29/2024 04:19 AM    HDL 73 01/29/2024 04:19 AM    TRIGLYCERIDE 120 01/29/2024 04:19 AM       Lab Results   Component Value Date/Time    SODIUM 138 02/02/2024 06:51 AM    POTASSIUM 3.7 02/02/2024 06:51 AM    CHLORIDE 103 02/02/2024 06:51 AM    CO2 24 02/02/2024 06:51 AM    GLUCOSE 101 (H) 02/02/2024 06:51 AM    BUN 7 (L) 02/02/2024 06:51 AM    CREATININE 0.78 02/02/2024 06:51 AM     Lab Results   Component Value Date/Time    ALKPHOSPHAT 40 01/30/2024 03:06 AM    ASTSGOT 17 01/30/2024 03:06 AM    ALTSGPT 27 01/30/2024 03:06 AM    TBILIRUBIN 0.4 01/30/2024 03:06 AM     TTE (1/28/2024):  Echocardiogram 1/28/2024  CONCLUSIONS  Normal left ventricular systolic function.  The left ventricular ejection fraction is visually estimated to be 55-  60%.  There is mild flattening of the interventricular septum.  Normal diastolic function.  The right ventricle is severely dilated with reduced systolic function.  Estimated right ventricular systolic pressure is 50 mmHg + right atrial   pressure.  The inferior vena cava is not well visualized.  Mild tricuspid regurgitation.     Left and Right HC (2/1/2024):  Summary:  Two-vessel coronary artery disease, successful PCI.  Severe pulmonary arterial hypertension, no response to vasodilator     Findings:  Left main-long left main, no significant stenosis.  Left anterior descending artery-midportion is calcified with 50% stenosis after diagonal bifurcation, IVUS showed circumferential calcium but minimal luminal area is adequate 7-8 mm².  Distal LAD has focal 80% stenosis  Left circumflex artery-small left circumflex artery system, no  significant stenosis in the marginal branch  Right coronary artery-large vessel, 95% stenosis in the distal portion.     Hemodynamics:  Aortic pressure 136 x 69 with a mean of 116  Heart rate 75 bpm  Pulmonary arterial pressure 79/27 with a mean of 44  Pulmonary capillary wedge pressure 12 mmHg  Right ventricular pressure 63/11 with a EDP 13  Right atrial pressure 11 mmHg  Left ventricular end-diastolic pressure 13 mmHg  Cardiac output 4.7 L/min  Cardiac index 2.33 L/min/m2 by thermodilution method.  Pulmonary vascular resistance 7 Wood units  Manuelito 4.73, favorable  With adenosine no significant response with pulmonary arterial pressures.      Assessment & Plan     1. MARTINEZ (dyspnea on exertion)  Referral to Pulmonary and Sleep Medicine    PULMONARY FUNCTION TESTS -Test requested: Complete Pulmonary Function Test    Referral to Pharmacotherapy Service    furosemide (LASIX) 20 MG Tab    potassium chloride SA (KDUR) 20 MEQ Tab CR    VITAMIN D,25 HYDROXY (DEFICIENCY)    Basic Metabolic Panel    nitroglycerin (NITROSTAT) 0.4 MG SL Tab      2. Pulmonary hypertension (HCC)  Referral to Pulmonary and Sleep Medicine    PULMONARY FUNCTION TESTS -Test requested: Complete Pulmonary Function Test    Referral to Pharmacotherapy Service    furosemide (LASIX) 20 MG Tab    potassium chloride SA (KDUR) 20 MEQ Tab CR    VITAMIN D,25 HYDROXY (DEFICIENCY)    Basic Metabolic Panel    nitroglycerin (NITROSTAT) 0.4 MG SL Tab      3. NSTEMI (non-ST elevated myocardial infarction) (HCC)  Referral to Pulmonary and Sleep Medicine    PULMONARY FUNCTION TESTS -Test requested: Complete Pulmonary Function Test    Referral to Pharmacotherapy Service    furosemide (LASIX) 20 MG Tab    potassium chloride SA (KDUR) 20 MEQ Tab CR    VITAMIN D,25 HYDROXY (DEFICIENCY)    Basic Metabolic Panel    amLODIPine (NORVASC) 5 MG Tab    carvedilol (COREG) 12.5 MG Tab    clopidogrel (PLAVIX) 75 MG Tab    aspirin 81 MG EC tablet    lisinopril (PRINIVIL) 40 MG tablet     rosuvastatin (CRESTOR) 10 MG Tab    nitroglycerin (NITROSTAT) 0.4 MG SL Tab      4. History of mitral valve repair  Referral to Pulmonary and Sleep Medicine    PULMONARY FUNCTION TESTS -Test requested: Complete Pulmonary Function Test    Referral to Pharmacotherapy Service    furosemide (LASIX) 20 MG Tab    potassium chloride SA (KDUR) 20 MEQ Tab CR    VITAMIN D,25 HYDROXY (DEFICIENCY)    Basic Metabolic Panel    nitroglycerin (NITROSTAT) 0.4 MG SL Tab      5. Elevated brain natriuretic peptide (BNP) level  Referral to Pulmonary and Sleep Medicine    PULMONARY FUNCTION TESTS -Test requested: Complete Pulmonary Function Test    Referral to Pharmacotherapy Service    furosemide (LASIX) 20 MG Tab    potassium chloride SA (KDUR) 20 MEQ Tab CR    VITAMIN D,25 HYDROXY (DEFICIENCY)    Basic Metabolic Panel    nitroglycerin (NITROSTAT) 0.4 MG SL Tab      6. Other forms of dyspnea        7. Coronary artery disease involving native coronary artery of native heart without angina pectoris        8. Status post coronary artery stent placement            Medical Decision Making: Today's Assessment/Status/Plan:        CAD, NSTEMI, s/p NORM/PCI to distalLAD and RCA (2/1/2024)  -Continue DAPT x 1 year with Plavix and aspirin 81 mg daily  -History of statin intolerance.  Patient able to tolerate rosuvastatin 10.  Referral to lipid clinic for PSK 9i  therapy.  Check vitamin D  -Continue lisinopril 40 mg daily  -Continue Coreg 12.5 mg twice daily  -BP in office today elevated.  Add furosemide 20 mg daily with potassium supplementation.  BMP in 2 weeks  -NTG as needed  -Cardiac rehab referral placed.  Transportation may pose a barrier as patient lives in [place].  -Discussed worsening symptoms of chest pain, patient to go the emergency room    Hx Mitral Valve repair (15 years ago; [place])  -Stable on echo.  Continue surveillance    MARTINEZ; precapillary pulmonary hypertension  -Chest CTA reviewed per above  -Pulmonary function  testing  -Referral to pulmonary placed    FU in clinic in 1 month with cardiology. Sooner if needed.    Patient verbalizes understanding and agrees with the plan of care.     I personally spent a total of 38 minutes which includes face-to-face time and non-face-to-face time spent on preparing to see the patient, reviewing prior notes and tests, obtaining history from the patient, performing a medically appropriate exam, counseling and educating the patient, ordering medications/tests/procedures/referrals as clinically indicated, collaborating with and documenting information in the electronic medical record.    OMAIRA Gaytan, HF-Cert   [facility]  [phone]    PLEASE NOTE: This Note was created using voice recognition Software. I have made every reasonable attempt to correct obvious errors, but I expect that there are errors of grammar and possibly content that I did not discover before finalizing the note           Spine appears normal, range of motion is not limited, no muscle or joint tenderness

## 2024-02-21 ENCOUNTER — TELEPHONE (OUTPATIENT)
Dept: HEALTH INFORMATION MANAGEMENT | Facility: OTHER | Age: 69
End: 2024-02-21
Payer: MEDICARE

## 2024-03-06 DIAGNOSIS — Z98.890 HISTORY OF MITRAL VALVE REPAIR: ICD-10-CM

## 2024-03-06 DIAGNOSIS — R79.89 ELEVATED BRAIN NATRIURETIC PEPTIDE (BNP) LEVEL: ICD-10-CM

## 2024-03-06 DIAGNOSIS — I21.4 NSTEMI (NON-ST ELEVATED MYOCARDIAL INFARCTION) (HCC): ICD-10-CM

## 2024-03-06 DIAGNOSIS — R06.09 DOE (DYSPNEA ON EXERTION): ICD-10-CM

## 2024-03-06 DIAGNOSIS — I27.20 PULMONARY HYPERTENSION (HCC): ICD-10-CM

## 2024-03-14 ENCOUNTER — NON-PROVIDER VISIT (OUTPATIENT)
Dept: VASCULAR LAB | Facility: MEDICAL CENTER | Age: 69
End: 2024-03-14
Attending: INTERNAL MEDICINE
Payer: MEDICARE

## 2024-03-14 ENCOUNTER — OFFICE VISIT (OUTPATIENT)
Dept: CARDIOLOGY | Facility: MEDICAL CENTER | Age: 69
End: 2024-03-14
Attending: NURSE PRACTITIONER
Payer: MEDICARE

## 2024-03-14 DIAGNOSIS — Z95.5 STATUS POST CORONARY ARTERY STENT PLACEMENT: ICD-10-CM

## 2024-03-14 DIAGNOSIS — Z98.890 HISTORY OF MITRAL VALVE REPAIR: ICD-10-CM

## 2024-03-14 DIAGNOSIS — I21.4 NSTEMI (NON-ST ELEVATED MYOCARDIAL INFARCTION) (HCC): ICD-10-CM

## 2024-03-14 DIAGNOSIS — R79.89 ELEVATED BRAIN NATRIURETIC PEPTIDE (BNP) LEVEL: ICD-10-CM

## 2024-03-14 DIAGNOSIS — I21.4 NSTEMI (NON-ST ELEVATED MYOCARDIAL INFARCTION) (HCC): Primary | ICD-10-CM

## 2024-03-14 DIAGNOSIS — I25.10 CORONARY ARTERY DISEASE INVOLVING NATIVE CORONARY ARTERY OF NATIVE HEART WITHOUT ANGINA PECTORIS: ICD-10-CM

## 2024-03-14 DIAGNOSIS — R06.09 OTHER FORMS OF DYSPNEA: ICD-10-CM

## 2024-03-14 DIAGNOSIS — I27.20 PULMONARY HYPERTENSION (HCC): ICD-10-CM

## 2024-03-14 PROCEDURE — 3074F SYST BP LT 130 MM HG: CPT | Performed by: NURSE PRACTITIONER

## 2024-03-14 PROCEDURE — 99213 OFFICE O/P EST LOW 20 MIN: CPT

## 2024-03-14 PROCEDURE — 99214 OFFICE O/P EST MOD 30 MIN: CPT | Performed by: NURSE PRACTITIONER

## 2024-03-14 PROCEDURE — 99212 OFFICE O/P EST SF 10 MIN: CPT | Performed by: NURSE PRACTITIONER

## 2024-03-14 PROCEDURE — 3078F DIAST BP <80 MM HG: CPT | Performed by: NURSE PRACTITIONER

## 2024-03-14 ASSESSMENT — FIBROSIS 4 INDEX: FIB4 SCORE: 1.38

## 2024-03-14 NOTE — PROGRESS NOTES
[facility] - Initial Visit  Date of Service: 03/14/24    Osman Guzmán JrKeiko has been referred for evaluation and management of dyslipidemia    Referral Source: EKATERINA Coleman    Subjective    HPI  History of ASCVD: Yes, Details: CAD, NSTEMI, s/p NORM/PCI to distal LAD and RCA (02/01/24)  Other Established (non-atherosclerotic) Vascular Disease, if Present: None  Age at Initial Diagnosis of Dyslipidemia: unknown, ~15 years ago around the time of mitral valve repair    Current Prescription Lipid Lowering Medications - including dose:   Statin: rosuvastatin 10 mg daily  Non-Statin: None  Current Lipid Lowering and Related Supplements:   Omega 3 fatty acid  Any Current Side Effects Potentially Related to Lipid Lowering therapy?   Yes, Details: occasional muscle aches but overall tolerable  Current Adherence to Lipid Lowering Therapies   Complete  Previously Attempted Interventions for Lipids - including outcome  Statin: Atorvastatin    Outcome:  Severe myalgia  Non-Statin: None   Outcome: N/A  Any Previous History of Statin Intolerance?   Yes, Details: See above  Baseline Lipids Prior to Treatment:    Latest Reference Range & Units 01/29/24 04:19   Cholesterol,Tot 100 - 199 mg/dL 240 (H)   Triglycerides 0 - 149 mg/dL 120   HDL >=40 mg/dL 73   LDL <100 mg/dL 143 (H)   (H): Data is abnormally high  Other Pertinent History: Pulmonary HTN  History of other CV risk factors: Mitral valve repair ~15 years ago, possible atrial fibrillation    PAST MEDICAL HISTORY:  has a past medical history of History of mitral valve repair.    PAST SURGICAL HISTORY:  has no past surgical history on file.    CURRENT MEDICATIONS:   Current Outpatient Medications:     carvedilol, 12.5 mg, Oral, BID WITH MEALS    clopidogrel, 75 mg, Oral, DAILY    aspirin, 81 mg, Oral, DAILY    lisinopril, 40 mg, Oral, DAILY    rosuvastatin, 10 mg, Oral, Q EVENING    nitroglycerin, 0.4 mg, Sublingual, PRN    Niacinamide-Zn-Cu-Vrgrem-Ly-Nr  (NICOTINAMIDE PO), 1 Tablet, Oral, DAILY    Medium Chain Triglycerides, 1 Each, Oral, DAILY    ALLERGIES: Lipitor [atorvastatin]    FAMILY HISTORY: Unknown per patient, he states that he has no family    SOCIAL HISTORY   Social History     Tobacco Use   Smoking Status Never   Smokeless Tobacco Never     Change in weight:  reports weight gain of ~30 lbs since recent hospitalization  Exercise habits:  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 Plans to eventually resume usual exercise routine.   Diet: low carbohydrate, low sodium      Objective      There were no vitals filed for this visit.   Physical Exam    DATA REVIEW:  Most Recent Lipid Panel:   Lab Results   Component Value Date    CHOLSTRLTOT 240 (H) 01/29/2024    TRIGLYCERIDE 120 01/29/2024    HDL 73 01/29/2024     (H) 01/29/2024     Other Pertinent Blood Work:   Lab Results   Component Value Date    SODIUM 138 02/02/2024    POTASSIUM 3.7 02/02/2024    CHLORIDE 103 02/02/2024    CO2 24 02/02/2024    ANION 11.0 02/02/2024    GLUCOSE 101 (H) 02/02/2024    BUN 7 (L) 02/02/2024    CREATININE 0.78 02/02/2024    CALCIUM 9.1 02/02/2024    ASTSGOT 17 01/30/2024    ALTSGPT 27 01/30/2024    ALKPHOSPHAT 40 01/30/2024    TBILIRUBIN 0.4 01/30/2024    ALBUMIN 3.2 02/01/2024    AGRATIO 1.1 01/30/2024    TSHULTRASEN 1.840 01/29/2024       Other: NA    Recent Imaging Studies:    None since last visit        ASSESSMENT AND PLAN  Patient Type, check all that apply:   Secondary Prevention  Established Atherosclerotic Cardiovascular Disease (ASCVD)  Yes, Details: CAD, NSTEMI, s/p NORM/PCI to distal LAD and RCA (02/01/24)  Other Established (non-atherosclerotic) Vascular Disease, if Present:  None  Evidence of Heterozygous Familial Hypercholesterolemia (FH):   Unknown  ACC/AHA Indication for Statin Therapy, david all that apply:  Established ASCVD: Indication for High intensity statin   Calculated Risk for ASCVD, if applicable    N/A  Other  Significant Risk Markers, if any, david all that apply   None  Goal LDL-C and nonHDL-C based on Clinic Protocol  LDL-C:   <70 mg/dL  Lifestyle Recommendations From Today’s Visit:    Eating Plan: Concentrate on  Low sat/Trans fat and Low simple carb  Exercise: Increase as tolerated  Statin Therapy Recommendations from Today’s Visit:   Continue rosuvastatin 10 mg daily  Non-Statin Medications Recommendations from Today’s Visit:   None  Indication for PCSK9 Inhibitor, if applicable:  Not currently indicated  Supplements Recommended at this visit:   Continue omega 3 fatty acid daily   Recommendations for Other Cardiovascular Risk Factors, david all that apply:   Hypertension- continue to monitor BP at home    Other Issues:  Patient presents today to establish care for lipid mgmt  He initiated rosuvastatin 10 mg daily after ASCVD event last month and occasionally experiences muscle aches. Advised patient to inform clinic if he develops severe myalgia from current statin therapy.  Given tolerability issues, will continue current statin dose (moderate intensity) rather than increase to high intensity statin  Patient highly motivated to work on lifestyle modifications  Discussed LDL goal of < 70 mg/dl, however will also order Lp(a) and apo(B) to aid with risk stratification and to determine need for more stringent LDL goal of < 55 given unknown family history  Will repeat lipid panel prior to making changes to pharmacotherapy    Studies Ordered at Todays Visit: None  Blood Work Ordered At Today’s visit: Lipid panel, CMP, Lp(a), Apo(B)   Follow-Up: 6 weeks    Ava Jimenez, PharmD    CC:  Jennifer Griffo APRN Michael Bloch, MD            [facility] Pharmacotherapy Program Consent      Name    Osman Guzmán     MRN number: [id number]    the following are guidelines for participation in the [facility] Pharmacotherapy Program.     I, ____Osman Guzmán Jr._____, understand and voluntarily agree to participate  in the [facility] Pharmacotherapy Program and to have services provided to me by pharmacists working in collaboration with my provider.    I understand the pharmacist within the [facility] Pharmacotherapy Program may initiate, modify or discontinue my medications, order appropriate testing and appointments, perform exams, monitor treatment, and make clinical evaluations and decisions pursuant to a collaborative practice agreement with my provider.  I understand the pharmacist within the [facility] Pharmacotherapy Program is not a physician, osteopathic physician, advanced practice registered nurse or physician assistant and may not diagnose.  I will take all my medications as instructed and not change the way I take it without first talking to my provider or a pharmacist within the [facility] Pharmacotherapy Program.  I understand that if I am late to my appointment I may not be able to be seen by a pharmacist at that time and will have to reschedule my appointment.  During appointment with pharmacist I understand that pharmacist has the right not to answer questions or perform services outside the pharmacist’s scope of practice.  By signing below, I provide informed consent for the pharmacist to provide these services and for my participation in the [facility]otherapy Program.      Osman Guzmán Jr.           [id number]          03/14/24  Patient Name                   MRN number  Date     ____Obtained verbal consent from Osman Guzmán Jr.____  Patient Signature

## 2024-03-14 NOTE — Clinical Note
Can we see if there is any earlier availability with at least NOE?  No improvement for MARTINEZ despite cardiac optimization.

## 2024-03-14 NOTE — PATIENT INSTRUCTIONS
Stop furosemide and potassium    -Discussed the importance of monitoring weights at home daily. Call office if weight and shortness of breath increasing greater than 3 lbs in 1 day or greater than 5 lbs in 1 week.     Checking Blood Pressure:  -Blood pressure cuff, spend in the $40-65, with good return policy  -It should be automatic, upper arm, measure your arm to get the correct size, probably adult Large  -Put the cuff in place, rest arm on table near height of your heart, sit quietly for 5 min, legs uncrossed, with back support, then take your blood pressure, write it down, keep a log  -Check once a day. Ideally, 2 hours after medications.  -Can bring your cuff to at least one appointment where it can be calibrated to a manual cuff if you are concerned.  -Goal blood pressure is at least under 130/80, ideally under 120/80.  If you think your BP is overall too high, let us know in the office, we can adjust medications, can use MyChart or call the [facility] office: [phone].  -If you feel dizzy, please check your blood pressure.  If your blood pressure is less than 90/60 with dizziness call our office at [phone].    -Continue to monitor blood pressures, heart rates, and daily weights in log provided in office today. Please bring to next appointment to review with provider.     Salt=sodium=sea salt, guidelines say stay under 2,500 mg daily   Get salt smart, start looking at labels, count it up.  Salt is hidden in everything, salad dressing, sauces, cheese, most canned food, any processed meat.     [de-identified] : Ms. Key is a 63 year old female who presents to office for initial consultation of hypogammaglobulinemia (IgG 289, IgA 43) and erythrocytosis (hgb 15.6 hct 51.4%).\par She reports being anemic while she was menstruating.  She was first told she had abnormal blood work about 3 years ago, was tested for hemochromatosis about 1 year ago-reports being negative.\par She denies any illness, fevers.\par \par \par Age of Menarche: 15\par Age of Menopause: 58\par OCP/HRT: OCP x 2 years\par \par \par Smoke: never\par ETOH: rare\par Illicit: never\par \par Family History\par Sister (younger) Breast Ca dx age 58, BRCA negative\par Sister (older) Breast Ca dx late 50's (), BRCA negative\par Maternal Uncle Liver Ca dx 70's\par Maternal Aunt Uterine Ca dx 50's\par Mother Breast and Uterine dx age mid 60's/ late 50's\par Brother Prostate Ca dx mid 50's\par Father Basal Cell?\par Nephew NHL dx early 30's\par \par 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 Covid diagnosis a few months ago but no respiratory or sinus symptoms

## 2024-03-14 NOTE — PROGRESS NOTES
Chief Complaint   Patient presents with    Shortness of Breath       Subjective     Osman Guzmán is a 68 y.o. male who presents today for hospital follow-up after NSTEMI.  Patient has additional medical history mitral valve repair, asthma, statin intolerance.  During patient's hospitalization he was incidentally found to precapillary pulmonary hypertension.    Today, patient reports MARTINEZ has remained the same.  Patient reports that his chest pain has greatly improved but he continues to experience occasionally but has not needed sublingual NTG.  Patient reports that mild chest pain resolves after approximately 1 minute.  Otherwise, patient denies shortness of breath at rest, palpitations, dizziness/lightheadedness, orthopnea, PND, or edema.  Patient has been increasing his activity tolerance.  Patient can tolerate approximately 3 minutes of moderate intensity exercise before needing to catch his breath.    Patient appears euvolemic on exam, we will hold diuretic therapy at this time with written parameters to resume as needed.  We will also discontinue supplemental potassium after reviewing most recent lab work in media.  Patient to establish with PCP regarding A1c at 5.9, pre-DM.  Patient has follow-up with lipid clinic after her appointment today.  We will collaborate with pulmonary regarding noncardiac etiologies for MARTINEZ and pulmonary hypertension.  Patient to follow-up with cardiology in 3 months.      2/15/2024: Initial 6 minute walk test, patient was able to complete 321 m during 6 minute walk test.  O2 saturation at baseline was 98% and at the end of the test, the O2 saturation was 97%. He reported level 5 of dyspnea on Stephanie scale.  Patient was able to walk for 6 minutes.      Past Medical History:   Diagnosis Date    History of mitral valve repair      History reviewed. No pertinent surgical history.  History reviewed. No pertinent family history.  Social History     Socioeconomic History    Marital status:  Single     Spouse name: Not on file    Number of children: Not on file    Years of education: Not on file    Highest education level: Not on file   Occupational History    Not on file   Tobacco Use    Smoking status: Never    Smokeless tobacco: Never   Substance and Sexual Activity    Alcohol use: Not Currently    Drug use: Never    Sexual activity: Not on file   Other Topics Concern    Not on file   Social History Narrative    Not on file     Social Determinants of Health     Financial Resource Strain: Not on file   Food Insecurity: Not on file   Transportation Needs: Not on file   Physical Activity: Not on file   Stress: Not on file   Social Connections: Not on file   Intimate Partner Violence: Not on file   Housing Stability: Not on file     Allergies   Allergen Reactions    Lipitor [Atorvastatin]      Severe muscle crumpling        Outpatient Encounter Medications as of 3/14/2024   Medication Sig Dispense Refill    carvedilol (COREG) 12.5 MG Tab Take 1 Tablet by mouth 2 times a day with meals. 180 Tablet 3    clopidogrel (PLAVIX) 75 MG Tab Take 1 Tablet by mouth every day. 90 Tablet 3    aspirin 81 MG EC tablet Take 1 Tablet by mouth every day. 90 Tablet 3    lisinopril (PRINIVIL) 40 MG tablet Take 1 Tablet by mouth every day. 90 Tablet 3    rosuvastatin (CRESTOR) 10 MG Tab Take 1 Tablet by mouth every evening. 90 Tablet 3    nitroglycerin (NITROSTAT) 0.4 MG SL Tab Place 1 Tablet under the tongue as needed for Chest Pain (every 5 minutes for persistent chest pain. After 3rd dose, call 911). 25 Tablet 5    Niacinamide-Zn-Cu-Ozudwj-Po-Wt (NICOTINAMIDE PO) Take 1 Tablet by mouth every day. Nicotinomide mononitrate -- natural supplement for Alzheimer's      Medium Chain Triglycerides (MCT) Oil Take 1 Each by mouth every day.      [DISCONTINUED] furosemide (LASIX) 20 MG Tab Take 1 Tablet by mouth every day. 90 Tablet 3    [DISCONTINUED] potassium chloride SA (KDUR) 20 MEQ Tab CR Take 1 Tablet by mouth every day. 90  "Tablet 3    [DISCONTINUED] amLODIPine (NORVASC) 5 MG Tab Take 1 Tablet by mouth every day. 90 Tablet 3    [DISCONTINUED] vancomycin (VANCOCIN) 125 MG capsule Take 1 Capsule by mouth every 6 hours for 10 days, THEN 1 Capsule every 12 hours for 7 days, THEN 1 Capsule every day for 7 days, THEN 1 Capsule every 48 hours for 7 days, THEN 1 Capsule every 3 days for 14 days. (Patient not taking: Reported on 3/14/2024) 70 Capsule 0     No facility-administered encounter medications on file as of 3/14/2024.     ROS Complete review of systems negative except as noted in HPI/subjective           Objective     /54 (BP Location: Left arm, Patient Position: Sitting, BP Cuff Size: Adult)   Resp 16   Ht 1.753 m (5' 9\")   Wt 88.5 kg (195 lb 3.2 oz)   BMI 28.83 kg/m²     Physical Exam  Vitals reviewed.   Constitutional:       Appearance: He is well-developed.   HENT:      Head: Normocephalic and atraumatic.   Eyes:      Pupils: Pupils are equal, round, and reactive to light.   Neck:      Vascular: No JVD.   Cardiovascular:      Rate and Rhythm: Normal rate and regular rhythm.      Heart sounds: Normal heart sounds. No murmur heard.     No friction rub. No gallop.   Pulmonary:      Effort: Pulmonary effort is normal. No respiratory distress.      Breath sounds: Normal breath sounds.   Abdominal:      General: Bowel sounds are normal. There is distension.      Palpations: Abdomen is soft.   Musculoskeletal:      Right lower leg: No edema.      Left lower leg: No edema.   Skin:     General: Skin is warm and dry.      Findings: No erythema.   Neurological:      General: No focal deficit present.      Mental Status: He is alert and oriented to person, place, and time.   Psychiatric:         Behavior: Behavior normal.            Lab Results   Component Value Date/Time    CHOLSTRLTOT 240 (H) 01/29/2024 04:19 AM     (H) 01/29/2024 04:19 AM    HDL 73 01/29/2024 04:19 AM    TRIGLYCERIDE 120 01/29/2024 04:19 AM       Lab " Results   Component Value Date/Time    SODIUM 138 02/02/2024 06:51 AM    POTASSIUM 3.7 02/02/2024 06:51 AM    CHLORIDE 103 02/02/2024 06:51 AM    CO2 24 02/02/2024 06:51 AM    GLUCOSE 101 (H) 02/02/2024 06:51 AM    BUN 7 (L) 02/02/2024 06:51 AM    CREATININE 0.78 02/02/2024 06:51 AM     Lab Results   Component Value Date/Time    ALKPHOSPHAT 40 01/30/2024 03:06 AM    ASTSGOT 17 01/30/2024 03:06 AM    ALTSGPT 27 01/30/2024 03:06 AM    TBILIRUBIN 0.4 01/30/2024 03:06 AM     TTE (1/28/2024):  Echocardiogram 1/28/2024  CONCLUSIONS  Normal left ventricular systolic function.  The left ventricular ejection fraction is visually estimated to be 55-  60%.  There is mild flattening of the interventricular septum.  Normal diastolic function.  The right ventricle is severely dilated with reduced systolic function.  Estimated right ventricular systolic pressure is 50 mmHg + right atrial   pressure.  The inferior vena cava is not well visualized.  Mild tricuspid regurgitation.     Left and Right HC (2/1/2024):  Summary:  Two-vessel coronary artery disease, successful PCI.  Severe pulmonary arterial hypertension, no response to vasodilator     Findings:  Left main-long left main, no significant stenosis.  Left anterior descending artery-midportion is calcified with 50% stenosis after diagonal bifurcation, IVUS showed circumferential calcium but minimal luminal area is adequate 7-8 mm².  Distal LAD has focal 80% stenosis  Left circumflex artery-small left circumflex artery system, no significant stenosis in the marginal branch  Right coronary artery-large vessel, 95% stenosis in the distal portion.     Hemodynamics:  Aortic pressure 136 x 69 with a mean of 116  Heart rate 75 bpm  Pulmonary arterial pressure 79/27 with a mean of 44  Pulmonary capillary wedge pressure 12 mmHg  Right ventricular pressure 63/11 with a EDP 13  Right atrial pressure 11 mmHg  Left ventricular end-diastolic pressure 13 mmHg  Cardiac output 4.7  L/min  Cardiac index 2.33 L/min/m2 by thermodilution method.  Pulmonary vascular resistance 7 Wood units  Manuelito 4.73, favorable  With adenosine no significant response with pulmonary arterial pressures.      Assessment & Plan     1. Pulmonary hypertension (HCC)        2. NSTEMI (non-ST elevated myocardial infarction) (HCC)        3. History of mitral valve repair        4. Elevated brain natriuretic peptide (BNP) level        5. Other forms of dyspnea        6. Coronary artery disease involving native coronary artery of native heart without angina pectoris        7. Status post coronary artery stent placement              Medical Decision Making: Today's Assessment/Status/Plan:        CAD, NSTEMI, s/p NORM/PCI to distalLAD and RCA (2/1/2024)  -Continue DAPT x 1 year with Plavix and aspirin 81 mg daily  -History of statin intolerance.  Patient able to tolerate rosuvastatin 10.  Referral to lipid clinic for PSK 9i  therapy.  Check vitamin D  -Continue lisinopril 40 mg daily  -Continue Coreg 12.5 mg twice daily  -BP in office today elevated.  Add furosemide 20 mg daily with potassium supplementation.  BMP in 2 weeks  -NTG as needed  -Cardiac rehab referral placed.  Transportation may pose a barrier as patient lives in [place].  -Discussed worsening symptoms of chest pain, patient to go the emergency room    Hx Mitral Valve repair (15 years ago; [place])  -Stable on echo.  Continue surveillance    MARTINEZ; precapillary pulmonary hypertension  -Denies improvement since coronary stenting.  -Chest CTA reviewed per above  -Pulmonary function testing  -Patient to establish in May, will collaborate with pulmonary for earlier follow-up if possible    FU in clinic in 3 month with cardiology. Sooner if needed.    Patient verbalizes understanding and agrees with the plan of care.     OMAIRA Gaytan, HF-Cert   [facility]  [phone]    PLEASE NOTE: This Note was created using voice  recognition Software. I have made every reasonable attempt to correct obvious errors, but I expect that there are errors of grammar and possibly content that I did not discover before finalizing the note

## 2024-03-15 VITALS
RESPIRATION RATE: 16 BRPM | WEIGHT: 195.2 LBS | HEART RATE: 76 BPM | BODY MASS INDEX: 28.91 KG/M2 | DIASTOLIC BLOOD PRESSURE: 54 MMHG | SYSTOLIC BLOOD PRESSURE: 112 MMHG | HEIGHT: 69 IN

## 2024-03-22 ENCOUNTER — TELEPHONE (OUTPATIENT)
Dept: SCHEDULING | Facility: IMAGING CENTER | Age: 69
End: 2024-03-22
Payer: MEDICARE

## 2024-03-22 DIAGNOSIS — E78.5 DYSLIPIDEMIA: ICD-10-CM

## 2024-03-22 DIAGNOSIS — I21.4 NSTEMI (NON-ST ELEVATED MYOCARDIAL INFARCTION) (HCC): ICD-10-CM

## 2024-03-22 NOTE — TELEPHONE ENCOUNTER
Caller: Amado  At [facility]    Topic/issue: Patient came in for labs today but codes will not be covered by Medicare and needing new order with new codes.    Labs that failed were: lipoprotein (a), Apolipoprotein B failed due to codes.    Callback Number: [phone]  Fax# [phone]    Thank you,  Daniella

## 2024-03-25 DIAGNOSIS — I21.4 NSTEMI (NON-ST ELEVATED MYOCARDIAL INFARCTION) (HCC): ICD-10-CM

## 2024-03-28 ENCOUNTER — TELEPHONE (OUTPATIENT)
Dept: CARDIOLOGY | Facility: MEDICAL CENTER | Age: 69
End: 2024-03-28
Payer: MEDICARE

## 2024-03-28 NOTE — TELEPHONE ENCOUNTER
Phone Number Called: [phone]     Call outcome: Spoke to patient regarding message below.    Pt states that he is running out of antibiotics for his c-diff and he has diarrhea consistent with continued c-diff infection.     Advised pt to contact PCP.

## 2024-03-28 NOTE — TELEPHONE ENCOUNTER
MARY Leeer: Osman Guzmán     Topic/issue: He is about to run out of his antibiotics and he is not sure how Dr wants to proceed, please advise     Callback Number: [phone]    Thank You   Milagro CALDERON

## 2024-04-04 ENCOUNTER — NON-PROVIDER VISIT (OUTPATIENT)
Dept: SLEEP MEDICINE | Facility: MEDICAL CENTER | Age: 69
End: 2024-04-04
Attending: NURSE PRACTITIONER
Payer: MEDICARE

## 2024-04-04 VITALS — WEIGHT: 195 LBS | BODY MASS INDEX: 28.88 KG/M2 | HEIGHT: 69 IN

## 2024-04-04 DIAGNOSIS — I21.4 NSTEMI (NON-ST ELEVATED MYOCARDIAL INFARCTION) (HCC): ICD-10-CM

## 2024-04-04 DIAGNOSIS — R06.09 DOE (DYSPNEA ON EXERTION): ICD-10-CM

## 2024-04-04 DIAGNOSIS — I27.20 PULMONARY HYPERTENSION (HCC): ICD-10-CM

## 2024-04-04 DIAGNOSIS — Z98.890 HISTORY OF MITRAL VALVE REPAIR: ICD-10-CM

## 2024-04-04 DIAGNOSIS — R79.89 ELEVATED BRAIN NATRIURETIC PEPTIDE (BNP) LEVEL: ICD-10-CM

## 2024-04-04 PROCEDURE — 94726 PLETHYSMOGRAPHY LUNG VOLUMES: CPT | Mod: 26 | Performed by: STUDENT IN AN ORGANIZED HEALTH CARE EDUCATION/TRAINING PROGRAM

## 2024-04-04 PROCEDURE — 94060 EVALUATION OF WHEEZING: CPT | Performed by: NURSE PRACTITIONER

## 2024-04-04 PROCEDURE — 94729 DIFFUSING CAPACITY: CPT | Mod: 26 | Performed by: STUDENT IN AN ORGANIZED HEALTH CARE EDUCATION/TRAINING PROGRAM

## 2024-04-04 PROCEDURE — 94729 DIFFUSING CAPACITY: CPT | Performed by: NURSE PRACTITIONER

## 2024-04-04 PROCEDURE — 94726 PLETHYSMOGRAPHY LUNG VOLUMES: CPT | Performed by: NURSE PRACTITIONER

## 2024-04-04 PROCEDURE — 94060 EVALUATION OF WHEEZING: CPT | Mod: 26 | Performed by: STUDENT IN AN ORGANIZED HEALTH CARE EDUCATION/TRAINING PROGRAM

## 2024-04-04 ASSESSMENT — PULMONARY FUNCTION TESTS
FEV1_PREDICTED: 3.19
FVC_PERCENT_PREDICTED: 101
FVC_LLN: 3.50
FEV1/FVC_PREDICTED: 76
FVC: 4.25
FEV1/FVC_PERCENT_CHANGE: 67
FEV1/FVC_PERCENT_LLN: 64
FEV1_PERCENT_CHANGE: 2
FEV1/FVC_PERCENT_PREDICTED: 101
FEV1/FVC_PERCENT_PREDICTED: 100
FEV1/FVC: 77
FEV1_LLN: 2.67
FEV1/FVC: 77
FEV1: 3.29
FEV1/FVC_PERCENT_PREDICTED: 76
FEV1/FVC_PERCENT_CHANGE: 0
FEV1/FVC_PERCENT_PREDICTED: 100
FEV1/FVC: 76.82
FEV1/FVC: 77
FVC_PERCENT_PREDICTED: 105
FEV1_PERCENT_PREDICTED: 105
FVC: 4.4
FVC_PREDICTED: 4.19
FEV1_PERCENT_PREDICTED: 103
FEV1/FVC_PERCENT_PREDICTED: 102
FEV1_PERCENT_CHANGE: 3
FEV1: 3.38

## 2024-04-04 ASSESSMENT — FIBROSIS 4 INDEX: FIB4 SCORE: 1.38

## 2024-04-04 NOTE — PROCEDURES
Technician: Tata Landrum RRT, CPFT  Good patient effort & cooperation.  The results of this test meet the ATS/ERS standards for acceptability & reproducibility.  Test was performed on the [employer] Body Plethysmograph-Elite DX system.  Predicted equations for Spirometry are GLI-2012, ITS for lung volumes, and GLI-2017 for DLCO.  The DLCO was uncorrected for Hgb.  A bronchodilator of Ventolin HFA -2puffs via spacer administered.  DLCO performed during dilation period.    Interpretation;     There is no significant obstruction or restriction.  No significant bronchodilator response.  Diffusion testing was not performed.

## 2024-04-09 DIAGNOSIS — I21.4 NSTEMI (NON-ST ELEVATED MYOCARDIAL INFARCTION) (HCC): ICD-10-CM

## 2024-04-29 ENCOUNTER — NON-PROVIDER VISIT (OUTPATIENT)
Dept: VASCULAR LAB | Facility: MEDICAL CENTER | Age: 69
End: 2024-04-29
Attending: INTERNAL MEDICINE
Payer: MEDICARE

## 2024-04-29 DIAGNOSIS — I21.4 NSTEMI (NON-ST ELEVATED MYOCARDIAL INFARCTION) (HCC): ICD-10-CM

## 2024-04-29 PROCEDURE — 99212 OFFICE O/P EST SF 10 MIN: CPT

## 2024-04-29 RX ORDER — EZETIMIBE 10 MG/1
10 TABLET ORAL DAILY
Qty: 30 TABLET | Refills: 3 | Status: SHIPPED | OUTPATIENT
Start: 2024-04-29

## 2024-04-29 NOTE — PROGRESS NOTES
[facility] - FollowUp Visit  Date of Service: 04/29/24    Osman Guzmán Jr. is here for follow up of dyslipidemia.    Subjective    HPI  Pertinent Interval History since last visit:   No changes. Patient has been tolerating rosuvastatin well.   Current Prescription Lipid Lowering Medications - including dose:   Statin: Rosuvastatin 10 mg daily  Non-Statin: None  Current Lipid Lowering and Related Supplements:   Omega-3: 1 capsule daily  Any Current Side Effects Potentially Related to Lipid Lowering therapy?   Yes, Details: occasional muscle aches, nothing that is limiting patient's ADLs  Current Adherence to Lipid Lowering Therapies:  Complete  Any Previous History of Statin Intolerance?   Yes, Details: Statin: Atorvastatin - severe myalgias  Baseline Lipids Prior to Treatment:  Baseline Lipids Prior to Treatment:     Latest Reference Range & Units 01/29/24 04:19   Cholesterol,Tot 100 - 199 mg/dL 240 (H)   Triglycerides 0 - 149 mg/dL 120   HDL >=40 mg/dL 73   LDL <100 mg/dL 143 (H)     Other Pertinent History: Pulmonary HTN  History of other CV risk factors: Mitral valve repair ~15 years ago, possible atrial fibrillation    SOCIAL HISTORY  Social History     Tobacco Use   Smoking Status Never   Smokeless Tobacco Never      Change in weight:  Increase in weight; patient states when he got out of the hospital he started to eat more    Exercise habits:  Patient to see pulmonologist in August, as SOB is severely limiting; able to workout for maybe 3 minutes and then has to stop    - Increase as tolerated, understandable that patient may be limited to exercise  Diet: common diet; Mediterranean diet   Breakfast/Lunch: omelettes w/ vegetables and some sort of protein, coffee (black)  Snacks: oranges, yogurt w/ stevia bananas and vanilla extract,   Drinks: water        Objective    There were no vitals filed for this visit.   Physical Exam    DATA REVIEW  Most Recent Lipid Panel:         Other Pertinent Blood  Work:   Lab Results   Component Value Date    SODIUM 138 02/02/2024    POTASSIUM 3.7 02/02/2024    CHLORIDE 103 02/02/2024    CO2 24 02/02/2024    ANION 11.0 02/02/2024    GLUCOSE 101 (H) 02/02/2024    BUN 7 (L) 02/02/2024    CREATININE 0.78 02/02/2024    CALCIUM 9.1 02/02/2024    ASTSGOT 17 01/30/2024    ALTSGPT 27 01/30/2024    ALKPHOSPHAT 40 01/30/2024    TBILIRUBIN 0.4 01/30/2024    ALBUMIN 3.2 02/01/2024    AGRATIO 1.1 01/30/2024    TSHULTRASEN 1.840 01/29/2024       Other:  NA    Recent Imaging Studies:    None since last visit        ASSESSMENT AND PLAN  Patient Type, check all that apply:   Secondary Prevention  Established Atherosclerotic Cardiovascular Disease (ASCVD)  Yes, Details: CAD, NSTEMI, s/p NORM/PCI to distal LAD and RCA (02/01/24)  Other Established (non-atherosclerotic) Vascular Disease, if Present:    None  Evidence of Heterozygous Familial Hypercholesterolemia (FH): Unknown  ACC/AHA Indication for Statin Therapy, david all that apply:   Established ASCVD: Indication for High intensity statin    Calculated Risk for ASCVD, if applicable:  N/A  Other Significant Risk Markers, if any, david all that apply:  None  National Lipid Association (NLA) Goal (if applicable):  LDL-C:   <70 mg/dL  Lifestyle Recommendations From Today’s Visit:   Eating Plan: Concentrate on  Low sat/Trans fat and DASH/Med Style diet  Exercise: Increase as tolerated  Statin Recommendations from Today's Visit  CONTINUE Rosuvastatin 10 mg daily  Non-Statin Medications Recommendations from Today’s Visit:   START Ezetimibe 10 mg daily  Indication for PCSK9 Inhibitor, if applicable:  Not currently indicated  Supplements Recommended at this visit:  Continue current supplements  Recommendations for Other Cardiovascular Risk Factors, david all that apply:   Hypertension- continue to monitor BP at home; goal <130/80  Other Issues:  - Patient presents today for follow up on lipid management.   - Recommended continuing rosuvastatin and will  add on Ezetimibe to hopefully lower LDL. Discussed with patient that the addition of the Ezetimibe may not lower LDL to goal. Patient verbalized understanding but would like to pursue lifestyle management instead of the addition of a PCSK9i or Nexlitol/Nexlizet.   - Patient would like to be seen in 8 weeks to assess Ezetimibe and make sure a sufficient amount of time has passed for him to start making lifestyle modifications.   - Encouraged patient to exercise as tolerated due to patient's pulmonary issues. Patient verbalized understanding and voiced he will also be working on modifying his diet.    Studies Ordered at Todays Visit:  None   Blood Work Ordered At Today’s visit:   None  Follow-Up: 6/24/24 - as patient has another appointment scheduled that day  8 weeks - to assess tolerability with Ezetimibe.    Kevin Beavers, PharmD    AGree with above.   If patient still unwilling to escalate lipid lowering therapy at next visit. Consider f/u with Dr. Hamlin or myself for further discussion.    Michael J Bloch, MD  Certified Clinical Lipid Specialist  Medial Director, [facility]      CC:  Pcp Pt States None  Ale Brandon A.*

## 2024-05-12 NOTE — PROGRESS NOTES
Pulmonary Clinic- Initial Consult    Date of Service: 5/13/24    Referring Physician: Ale Brandon A.*    Reason for Consult: MARTINEZ, pulmonary hypertension 2/2 NSTEMI, mitral valve disease    Chief Complaint:   Chief Complaint   Patient presents with    Establish Care     Referred by Ale TOBAR for MARTINEZ/Pulm. HTN    Other     Echo 01/29/24, PFT 04/04/24, CXR 02/01/24       HPI:   Osman Guzmán Jr. is a 68 y.o. male who is followed by Ale Brandon and is referred to the pulmonary clinic for MARTINEZ, pulmonary hypertension 2/2 NSTEMI, mitral valve disease. Osman gives a detailed history of feeling fine and having excellent exercise tolerance until this started to decline about 5 years ago.  He c/o left sided chest pain and underwent LHC with Stents placed to the LAD (80%) and RCA (95%) in February.  He states his left sided chest pain has now been replaced with a central pressure like chest pain he feels if from his lungs.  He is a never smoker, denies methamphetamine use and denies any diet pill use in the past.  He is a retired  with many different chemical and fuel exposures over the years, now retired.  He states he used to be able to do FIT (HIT) training for 5 minutes at a time and now he cannot do more than 1 minute.  This is worse since his heart cath.  On his heart cath he was noted to have an elevated PA pressure with mPAP of 44 with PVR of 7 wood units which is elevated, no vasodilator response.  His wedge was 12mmHg and LVEDP was 13mmHg both of which are high-normal. He denies GERD or significant seasonal allergies.  He does report he wheezes with exercise which could potentially indicate exercise induced asthma or could represent cardiac wheeze.  He denies any articular pain or rashes and has no family history of rheumatologic disease. He has no known history of VTE.      PFTs are completely normal with supranormal DLCO which is near impossible with pulmonary  hypertension of any significance.     Past Medical History:   Diagnosis Date    Blood in urine     Chest pain     Chest tightness     Chickenpox     Coronary heart disease     Cough     Daytime sleepiness     Difficulty breathing     Dizziness     Hearing difficulty     Heart attack (HCC)     History of mitral valve repair     Hypertension     Obesity     Painful breathing     Palpitations     Shortness of breath     Tonsillitis     Valvular heart disease     Vision loss     Wears glasses     Wheezing        Past Surgical History:   Procedure Laterality Date    AORTIC VALVE REPLACEMENT         Social History     Socioeconomic History    Marital status: Single     Spouse name: Not on file    Number of children: Not on file    Years of education: Not on file    Highest education level: Not on file   Occupational History    Not on file   Tobacco Use    Smoking status: Never     Passive exposure: Never    Smokeless tobacco: Never   Vaping Use    Vaping Use: Never used   Substance and Sexual Activity    Alcohol use: Not Currently    Drug use: Never    Sexual activity: Not on file   Other Topics Concern    Not on file   Social History Narrative    Not on file     Social Determinants of Health     Financial Resource Strain: Not on file   Food Insecurity: Not on file   Transportation Needs: Not on file   Physical Activity: Not on file   Stress: Not on file   Social Connections: Not on file   Intimate Partner Violence: Not on file   Housing Stability: Not on file          History reviewed. No pertinent family history.    Current Outpatient Medications on File Prior to Visit   Medication Sig Dispense Refill    MAGNESIUM PO Take  by mouth every day. W/POTASSIUM, (DRINK)      ezetimibe (ZETIA) 10 MG Tab Take 1 Tablet by mouth every day. 30 Tablet 3    carvedilol (COREG) 12.5 MG Tab Take 1 Tablet by mouth 2 times a day with meals. 180 Tablet 3    clopidogrel (PLAVIX) 75 MG Tab Take 1 Tablet by mouth every day. 90 Tablet 3     "aspirin 81 MG EC tablet Take 1 Tablet by mouth every day. 90 Tablet 3    lisinopril (PRINIVIL) 40 MG tablet Take 1 Tablet by mouth every day. 90 Tablet 3    rosuvastatin (CRESTOR) 10 MG Tab Take 1 Tablet by mouth every evening. 90 Tablet 3    nitroglycerin (NITROSTAT) 0.4 MG SL Tab Place 1 Tablet under the tongue as needed for Chest Pain (every 5 minutes for persistent chest pain. After 3rd dose, call 911). 25 Tablet 5    Medium Chain Triglycerides (MCT) Oil Take 1 Each by mouth every day.      Niacinamide-Zn-Cu-Pdfcat-Rw-Zj (NICOTINAMIDE PO) Take 1 Tablet by mouth every day. Nicotinomide mononitrate -- natural supplement for Alzheimer's (Patient not taking: Reported on 5/13/2024)       No current facility-administered medications on file prior to visit.       Allergies: Lipitor [atorvastatin]      ROS:   Review of Systems   Constitutional:  Negative for chills, fever and malaise/fatigue.   HENT:  Negative for congestion.    Respiratory:  Positive for cough, sputum production, shortness of breath and wheezing.    Cardiovascular:  Positive for chest pain. Negative for palpitations and leg swelling.   Gastrointestinal:  Negative for heartburn.   Musculoskeletal:  Negative for joint pain.   Neurological:  Negative for dizziness and headaches.   Endo/Heme/Allergies:  Negative for environmental allergies.       Vitals:  BP (!) 140/76 (BP Location: Right arm, Patient Position: Sitting, BP Cuff Size: Adult)   Pulse 69   Resp 16   Ht 1.753 m (5' 9\")   Wt 90.7 kg (200 lb)   SpO2 95%     Physical Exam:  Physical Exam  Constitutional:       Appearance: Normal appearance.   HENT:      Head: Atraumatic.   Eyes:      Extraocular Movements: Extraocular movements intact.   Cardiovascular:      Rate and Rhythm: Normal rate and regular rhythm.      Heart sounds: Normal heart sounds.   Pulmonary:      Effort: Pulmonary effort is normal. No respiratory distress.      Breath sounds: Normal breath sounds. No wheezing or rales. "   Abdominal:      General: Abdomen is flat.   Musculoskeletal:         General: No swelling.   Skin:     General: Skin is warm and dry.   Neurological:      General: No focal deficit present.      Mental Status: He is alert.             Pertinent Studies:  Laboratory Data:    6MWT:  321m without supplemental O2  2/15/24    PFTs as reviewed by me personally show:        Imaging as reviewed by me personally show:    CTA:  IMPRESSION:     1.  No CT evidence of pulmonary embolism.     2.  There is calcific atherosclerosis including the coronary arteries.  Pertinent Cardiac Studies:  Echo:  1/28/24  CONCLUSIONS  Normal left ventricular systolic function.  The left ventricular ejection fraction is visually estimated to be 55-  60%.  There is mild flattening of the interventricular septum.  Normal diastolic function.  The right ventricle is severely dilated with reduced systolic function.  Estimated right ventricular systolic pressure is 50 mmHg + right atrial   pressure.  The inferior vena cava is not well visualized.  Mild tricuspid regurgitation.    LHC/RHC: 1/28/24  Findings:  Left main-long left main, no significant stenosis.  Left anterior descending artery-midportion is calcified with 50% stenosis after diagonal bifurcation, IVUS showed circumferential calcium but minimal luminal area is adequate 7-8 mm².  Distal LAD has focal 80% stenosis  Left circumflex artery-small left circumflex artery system, no significant stenosis in the marginal branch  Right coronary artery-large vessel, 95% stenosis in the distal portion.     Hemodynamics:  Aortic pressure 136 x 69 with a mean of 116  Heart rate 75 bpm  Pulmonary arterial pressure 79/27 with a mean of 44  Pulmonary capillary wedge pressure 12 mmHg  Right ventricular pressure 63/11 with a EDP 13  Right atrial pressure 11 mmHg  Left ventricular end-diastolic pressure 13 mmHg  Cardiac output 4.7 L/min  Cardiac index 2.33 L/min/m2 by thermodilution method.  Pulmonary  vascular resistance 7 Wood units  [place]uel[place] 4.73, favorable  With adenosine no significant response with pulmonary arterial pressures.    Assessment/Plan:    Problem List Items Addressed This Visit       Pulmonary hypertension (HCC)     Pulmonary Hypertension 2/2 Left heart disease (Group II) suspected.  Will complete workup for other causes of PH however patient's course is not consistent with a preexisting pulmonary cause of PH and rather acutely associated with his CAD.  He may have some RADS when he exercises which I will address.  Pulmonary function testing is not consistent with pulmonary hypertension.  CTA is negative for any suggestion of ILD as well as acute VTE.  Will obtain VQ scan to rule out CTEPH as well as CTD panel to rule out CTD-PH however patient has no symptoms of concern for CTD.  He has no history of drug abuse or diet pill use.    Plan:  - VQ scan with CXR prior  - Albuterol as needed prior to exercise  - Consider changing lisinopril to ARB due to cough  - CTD panel blood work  - Repeat Echo to assess for recovery after stent  - If unimproved after albuterol for exercise will perform CPET and consider pulmonary rehab  - At this point there is no indication for PH specific therapies however I would urge a close look into possible diastolic failure and GDMT for that.  - Follow up in 3 months         Relevant Medications    albuterol 108 (90 Base) MCG/ACT Aero Soln inhalation aerosol    Other Relevant Orders    CONNECTIVE TISSUE DISEASES PROFILE    NM-LUNG VENT/PERF IMAGING    EC-ECHOCARDIOGRAM COMPLETE W/O CONT    DX-CHEST-LIMITED (1 VIEW)        Return in about 12 weeks (around 8/5/2024).     This note was generated using voice recognition software which has a chance of producing errors of grammar and possibly content.  I have made every reasonable attempt to find and correct any obvious errors, but it should be expected that some may not be found prior to finalization of this note.    Time spent in  record review prior to patient arrival, reviewing results, and in face-to-face encounter totaled 45 min, excluding any procedures if performed.      Shirin Gannon MD RD  Pulmonary and Critical Care Medicine  [facility]

## 2024-05-13 ENCOUNTER — OFFICE VISIT (OUTPATIENT)
Dept: SLEEP MEDICINE | Facility: MEDICAL CENTER | Age: 69
End: 2024-05-13
Attending: NURSE PRACTITIONER
Payer: MEDICARE

## 2024-05-13 VITALS
RESPIRATION RATE: 16 BRPM | OXYGEN SATURATION: 95 % | DIASTOLIC BLOOD PRESSURE: 76 MMHG | HEIGHT: 69 IN | HEART RATE: 69 BPM | SYSTOLIC BLOOD PRESSURE: 140 MMHG | WEIGHT: 200 LBS | BODY MASS INDEX: 29.62 KG/M2

## 2024-05-13 DIAGNOSIS — I27.20 PULMONARY HYPERTENSION (HCC): ICD-10-CM

## 2024-05-13 PROCEDURE — 3078F DIAST BP <80 MM HG: CPT | Performed by: INTERNAL MEDICINE

## 2024-05-13 PROCEDURE — 99204 OFFICE O/P NEW MOD 45 MIN: CPT | Performed by: INTERNAL MEDICINE

## 2024-05-13 PROCEDURE — 3077F SYST BP >= 140 MM HG: CPT | Performed by: INTERNAL MEDICINE

## 2024-05-13 RX ORDER — ALBUTEROL SULFATE 90 UG/1
2 AEROSOL, METERED RESPIRATORY (INHALATION) EVERY 6 HOURS PRN
Qty: 8.5 G | Refills: 6 | Status: SHIPPED | OUTPATIENT
Start: 2024-05-13

## 2024-05-13 ASSESSMENT — ENCOUNTER SYMPTOMS
HEADACHES: 0
FEVER: 0
SPUTUM PRODUCTION: 1
CHILLS: 0
HEARTBURN: 0
PALPITATIONS: 0
SHORTNESS OF BREATH: 1
WHEEZING: 1
COUGH: 1
DIZZINESS: 0

## 2024-05-13 ASSESSMENT — FIBROSIS 4 INDEX: FIB4 SCORE: 1.38

## 2024-05-13 ASSESSMENT — PATIENT HEALTH QUESTIONNAIRE - PHQ9: CLINICAL INTERPRETATION OF PHQ2 SCORE: 0

## 2024-05-13 NOTE — ASSESSMENT & PLAN NOTE
Pulmonary Hypertension 2/2 Left heart disease (Group II) suspected.  Will complete workup for other causes of PH however patient's course is not consistent with a preexisting pulmonary cause of PH and rather acutely associated with his CAD.  He may have some RADS when he exercises which I will address.  Pulmonary function testing is not consistent with pulmonary hypertension.  CTA is negative for any suggestion of ILD as well as acute VTE.  Will obtain VQ scan to rule out CTEPH as well as CTD panel to rule out CTD-PH however patient has no symptoms of concern for CTD.  He has no history of drug abuse or diet pill use.    Plan:  - VQ scan with CXR prior  - Albuterol as needed prior to exercise  - Consider changing lisinopril to ARB due to cough  - CTD panel blood work  - Repeat Echo to assess for recovery after stent  - If unimproved after albuterol for exercise will perform CPET and consider pulmonary rehab  - At this point there is no indication for PH specific therapies however I would urge a close look into possible diastolic failure and GDMT for that.  - Follow up in 3 months

## 2024-05-14 ENCOUNTER — ANCILLARY PROCEDURE (OUTPATIENT)
Dept: CARDIOLOGY | Facility: MEDICAL CENTER | Age: 69
End: 2024-05-14
Attending: INTERNAL MEDICINE
Payer: MEDICARE

## 2024-05-14 DIAGNOSIS — I27.20 PULMONARY HYPERTENSION (HCC): ICD-10-CM

## 2024-05-14 LAB
LV EJECT FRACT  99904: 55
LV EJECT FRACT  99904: 55
LV EJECT FRACT MOD 2C 99903: 47.92
LV EJECT FRACT MOD 2C 99903: 56.89
LV EJECT FRACT MOD 4C 99902: 44.32
LV EJECT FRACT MOD 4C 99902: 55.67
LV EJECT FRACT MOD BP 99901: 51.06
LV EJECT FRACT MOD BP 99901: 55.71

## 2024-05-14 PROCEDURE — 93306 TTE W/DOPPLER COMPLETE: CPT | Mod: 26 | Performed by: INTERNAL MEDICINE

## 2024-05-16 ENCOUNTER — HOSPITAL ENCOUNTER (OUTPATIENT)
Dept: RADIOLOGY | Facility: MEDICAL CENTER | Age: 69
End: 2024-05-16
Attending: INTERNAL MEDICINE
Payer: MEDICARE

## 2024-05-16 DIAGNOSIS — I27.20 PULMONARY HYPERTENSION (HCC): ICD-10-CM

## 2024-06-23 NOTE — PROGRESS NOTES
Chief Complaint   Patient presents with    Follow-Up     F/v Dx:NSTEMI (non-ST elevated myocardial infarction) (HCC)    Abnormal EKG       Subjective     Osman Guzmán is a 68 y.o. male who presents today for hospital follow-up after NSTEMI.  Patient has additional medical history mitral valve repair, asthma, statin intolerance.  During patient's hospitalization he was incidentally found to precapillary pulmonary hypertension. Patient last seen by myself on 3/14/2024    Today, patient reports he remains physically active and has lost 10 pounds.  His MARTINEZ has slightly improved however still significantly noticeable since his hospitalization for NSTEMI.  Patient has used his sublingual NTG twice since discharge from the hospital where his chest pain has resolved.  Otherwise,  Patient denies palpitations, dizziness/lightheadedness, orthopnea, PND or Edema. Patient has been evaluated by pulmonary hypertension clinic where group 3, 4 have been ruled out.      Based on physical examination findings, patient is euvolemic. No JVD, lungs are clear to auscultation, no pitting edema in bilateral lower extremities, no ascites. Dry weight is 189 lbs.     2/15/2024: Initial 6 minute walk test, patient was able to complete 321 m during 6 minute walk test.  O2 saturation at baseline was 98% and at the end of the test, the O2 saturation was 97%. He reported level 5 of dyspnea on Stephanie scale.  Patient was able to walk for 6 minutes.    Past Medical History:   Diagnosis Date    Blood in urine     Chest pain     Chest tightness     Chickenpox     Coronary heart disease     Cough     Daytime sleepiness     Difficulty breathing     Dizziness     Hearing difficulty     Heart attack (HCC)     History of mitral valve repair     Hypertension     Obesity     Painful breathing     Palpitations     Shortness of breath     Tonsillitis     Valvular heart disease     Vision loss     Wears glasses     Wheezing      Past Surgical History:   Procedure  Laterality Date    AORTIC VALVE REPLACEMENT       History reviewed. No pertinent family history.  Social History     Socioeconomic History    Marital status: Single     Spouse name: Not on file    Number of children: Not on file    Years of education: Not on file    Highest education level: Not on file   Occupational History    Not on file   Tobacco Use    Smoking status: Never     Passive exposure: Never    Smokeless tobacco: Never   Vaping Use    Vaping status: Never Used   Substance and Sexual Activity    Alcohol use: Not Currently    Drug use: Never    Sexual activity: Not on file   Other Topics Concern    Not on file   Social History Narrative    Not on file     Social Determinants of Health     Financial Resource Strain: Not on file   Food Insecurity: Not on file   Transportation Needs: Not on file   Physical Activity: Not on file   Stress: Not on file   Social Connections: Not on file   Intimate Partner Violence: Not on file   Housing Stability: Not on file     Allergies   Allergen Reactions    Lipitor [Atorvastatin]      Severe muscle crumpling        Outpatient Encounter Medications as of 6/24/2024   Medication Sig Dispense Refill    spironolactone (ALDACTONE) 25 MG Tab Take 1 Tablet by mouth every day. 90 Tablet 3    furosemide (LASIX) 20 MG Tab Take 1 Tablet by mouth every day. 90 Tablet 3    MAGNESIUM PO Take  by mouth every day. W/POTASSIUM, (DRINK)      albuterol 108 (90 Base) MCG/ACT Aero Soln inhalation aerosol Inhale 2 Puffs every 6 hours as needed for Shortness of Breath. 8.5 g 6    ezetimibe (ZETIA) 10 MG Tab Take 1 Tablet by mouth every day. 30 Tablet 3    carvedilol (COREG) 12.5 MG Tab Take 1 Tablet by mouth 2 times a day with meals. 180 Tablet 3    clopidogrel (PLAVIX) 75 MG Tab Take 1 Tablet by mouth every day. 90 Tablet 3    aspirin 81 MG EC tablet Take 1 Tablet by mouth every day. 90 Tablet 3    lisinopril (PRINIVIL) 40 MG tablet Take 1 Tablet by mouth every day. 90 Tablet 3    rosuvastatin  "(CRESTOR) 10 MG Tab Take 1 Tablet by mouth every evening. 90 Tablet 3    nitroglycerin (NITROSTAT) 0.4 MG SL Tab Place 1 Tablet under the tongue as needed for Chest Pain (every 5 minutes for persistent chest pain. After 3rd dose, call 911). 25 Tablet 5    Niacinamide-Zn-Cu-Frmavk-Jg-Io (NICOTINAMIDE PO) Take 1 Tablet by mouth every day. Nicotinomide mononitrate -- natural supplement for Alzheimer's      Medium Chain Triglycerides (MCT) Oil Take 1 Each by mouth every day.       No facility-administered encounter medications on file as of 6/24/2024.     ROS Complete review of systems negative except as noted in HPI/subjective           Objective     /66 (BP Location: Left arm, Patient Position: Sitting, BP Cuff Size: Adult)   Pulse 73   Resp 14   Ht 1.753 m (5' 9\")   Wt 85.7 kg (189 lb)   SpO2 97%   BMI 27.91 kg/m²     Physical Exam  Vitals reviewed.   Constitutional:       Appearance: He is well-developed.   HENT:      Head: Normocephalic and atraumatic.   Eyes:      Pupils: Pupils are equal, round, and reactive to light.   Neck:      Vascular: No JVD.   Cardiovascular:      Rate and Rhythm: Normal rate and regular rhythm.      Heart sounds: Normal heart sounds. No murmur heard.     No friction rub. No gallop.   Pulmonary:      Effort: Pulmonary effort is normal. No respiratory distress.      Breath sounds: Normal breath sounds.   Abdominal:      General: Bowel sounds are normal. There is distension.      Palpations: Abdomen is soft.   Musculoskeletal:      Right lower leg: No edema.      Left lower leg: No edema.   Skin:     General: Skin is warm and dry.      Findings: No erythema.   Neurological:      General: No focal deficit present.      Mental Status: He is alert and oriented to person, place, and time.   Psychiatric:         Behavior: Behavior normal.            Lab Results   Component Value Date/Time    CHOLSTRLTOT 240 (H) 01/29/2024 04:19 AM     (H) 01/29/2024 04:19 AM    HDL 73 " 01/29/2024 04:19 AM    TRIGLYCERIDE 120 01/29/2024 04:19 AM       Lab Results   Component Value Date/Time    SODIUM 138 02/02/2024 06:51 AM    POTASSIUM 3.7 02/02/2024 06:51 AM    CHLORIDE 103 02/02/2024 06:51 AM    CO2 24 02/02/2024 06:51 AM    GLUCOSE 101 (H) 02/02/2024 06:51 AM    BUN 7 (L) 02/02/2024 06:51 AM    CREATININE 0.78 02/02/2024 06:51 AM     Lab Results   Component Value Date/Time    ALKPHOSPHAT 40 01/30/2024 03:06 AM    ASTSGOT 17 01/30/2024 03:06 AM    ALTSGPT 27 01/30/2024 03:06 AM    TBILIRUBIN 0.4 01/30/2024 03:06 AM     TTE (1/28/2024):  Echocardiogram 1/28/2024  CONCLUSIONS  Normal left ventricular systolic function.  The left ventricular ejection fraction is visually estimated to be 55-  60%.  There is mild flattening of the interventricular septum.  Normal diastolic function.  The right ventricle is severely dilated with reduced systolic function.  Estimated right ventricular systolic pressure is 50 mmHg + right atrial   pressure.  The inferior vena cava is not well visualized.  Mild tricuspid regurgitation.     Left and Right HC (2/1/2024):  Summary:  Two-vessel coronary artery disease, successful PCI.  Severe pulmonary arterial hypertension, no response to vasodilator     Findings:  Left main-long left main, no significant stenosis.  Left anterior descending artery-midportion is calcified with 50% stenosis after diagonal bifurcation, IVUS showed circumferential calcium but minimal luminal area is adequate 7-8 mm².  Distal LAD has focal 80% stenosis  Left circumflex artery-small left circumflex artery system, no significant stenosis in the marginal branch  Right coronary artery-large vessel, 95% stenosis in the distal portion.     Hemodynamics:  Aortic pressure 136 x 69 with a mean of 116  Heart rate 75 bpm  Pulmonary arterial pressure 79/27 with a mean of 44  Pulmonary capillary wedge pressure 12 mmHg  Right ventricular pressure 63/11 with a EDP 13  Right atrial pressure 11 mmHg  Left  ventricular end-diastolic pressure 13 mmHg  Cardiac output 4.7 L/min  Cardiac index 2.33 L/min/m2 by thermodilution method.  Pulmonary vascular resistance 7 Wood units  Manuelito 4.73, favorable  With adenosine no significant response with pulmonary arterial pressures.    TTE (5/14/2024):  Normal left ventricular size and systolic function.  The left ventricular ejection fraction is visually estimated to be 55%.  Mild concentric left ventricular hypertrophy.  Unable to assess diastolic function due to history of mitral valve   repair.  Severely dilated right ventricle with reduced systolic function.  Estimated right ventricular systolic pressure is 75 mmHg.  Mild biatrial dilation.  Known mitral valve repair that is functioning normally with appropriate   transvalvular gradients.  Moderate tricuspid regurgitation.  The inferior vena cava is not well-visualized, but appears dilated with   normal inspiratory collapse.     Compared to the prior study on 1/29/24, the estimated right ventricular   systolic pressure is higher.    NM-VQ scan (5/16/2024):  FINDINGS:  Ventilation images are unremarkable.  Perfusion images are unremarkable.    PFT (4/4/2024):  There is no significant obstruction or restriction. No significant bronchodilator response. Diffusion testing was not performed.   Assessment & Plan     1. NSTEMI (non-ST elevated myocardial infarction) (HCC)  spironolactone (ALDACTONE) 25 MG Tab    Comp Metabolic Panel    proBrain Natriuretic Peptide, NT    Lipid Profile    furosemide (LASIX) 20 MG Tab      2. History of mitral valve repair  spironolactone (ALDACTONE) 25 MG Tab    Comp Metabolic Panel    proBrain Natriuretic Peptide, NT    Lipid Profile    furosemide (LASIX) 20 MG Tab      3. Coronary artery disease involving native coronary artery of native heart without angina pectoris  spironolactone (ALDACTONE) 25 MG Tab    Comp Metabolic Panel    proBrain Natriuretic Peptide, NT    Lipid Profile    furosemide (LASIX)  20 MG Tab      4. Status post coronary artery stent placement  spironolactone (ALDACTONE) 25 MG Tab    Comp Metabolic Panel    proBrain Natriuretic Peptide, NT    Lipid Profile    furosemide (LASIX) 20 MG Tab      5. Elevated brain natriuretic peptide (BNP) level  spironolactone (ALDACTONE) 25 MG Tab    Comp Metabolic Panel    proBrain Natriuretic Peptide, NT    Lipid Profile    furosemide (LASIX) 20 MG Tab      6. High risk medication use  spironolactone (ALDACTONE) 25 MG Tab    Comp Metabolic Panel    proBrain Natriuretic Peptide, NT    Lipid Profile    furosemide (LASIX) 20 MG Tab      7. Dyslipidemia        8. MARTINEZ (dyspnea on exertion)        9. Essential hypertension, benign        10. Pulmonary hypertension (HCC)  spironolactone (ALDACTONE) 25 MG Tab    proBrain Natriuretic Peptide, NT    furosemide (LASIX) 20 MG Tab              Medical Decision Making: Today's Assessment/Status/Plan:        CAD, NSTEMI, s/p NORM/PCI to distalLAD and RCA (2/1/2024)  -Continue DAPT x 1 year with Plavix and aspirin 81 mg daily  -History of statin intolerance.  Patient able to tolerate rosuvastatin 10.  Referral to lipid clinic for PSK 9i  therapy.  Check vitamin D. Recheck lipids. Goal LDL <70.  -Continue lisinopril 40 mg daily  -Continue Coreg 12.5 mg twice daily  -NTG as needed  -Discussed worsening symptoms of chest pain, patient to go the emergency room    MARTINEZ; Hx Mitral Valve repair (15 years ago; [place]); Moderate TR; Dilated RV with RVSP 75 mmHG  -RHC data shows precapillary pulmonary hypertension  -Pulmonary studies unremarkable  -Follow up echo scheduled for August  -Dr. Gannon suspects cardiac etiology  -Connective tissues studies pending  -MARTINEZ remains  -Continue furosemide 20 mg daily.  -Add spironolactone 25 mg daily.  BMP, BNP in 2 weeks  -Low threshold to consider JULIANE for TR or repeat RHC    FU in clinic in 3 month with cardiology. Sooner if needed.    Patient verbalizes understanding and agrees with the plan of  care.     OMAIRA Gaytan, HF-Cert   [facility]  [phone]    PLEASE NOTE: This Note was created using voice recognition Software. I have made every reasonable attempt to correct obvious errors, but I expect that there are errors of grammar and possibly content that I did not discover before finalizing the note

## 2024-06-24 ENCOUNTER — NON-PROVIDER VISIT (OUTPATIENT)
Dept: VASCULAR LAB | Facility: MEDICAL CENTER | Age: 69
End: 2024-06-24
Attending: INTERNAL MEDICINE
Payer: MEDICARE

## 2024-06-24 ENCOUNTER — OFFICE VISIT (OUTPATIENT)
Dept: CARDIOLOGY | Facility: MEDICAL CENTER | Age: 69
End: 2024-06-24
Attending: NURSE PRACTITIONER
Payer: MEDICARE

## 2024-06-24 VITALS
HEIGHT: 69 IN | OXYGEN SATURATION: 97 % | RESPIRATION RATE: 14 BRPM | BODY MASS INDEX: 27.99 KG/M2 | WEIGHT: 189 LBS | DIASTOLIC BLOOD PRESSURE: 66 MMHG | SYSTOLIC BLOOD PRESSURE: 128 MMHG | HEART RATE: 73 BPM

## 2024-06-24 DIAGNOSIS — I25.10 CORONARY ARTERY DISEASE INVOLVING NATIVE CORONARY ARTERY OF NATIVE HEART WITHOUT ANGINA PECTORIS: ICD-10-CM

## 2024-06-24 DIAGNOSIS — I21.4 NSTEMI (NON-ST ELEVATED MYOCARDIAL INFARCTION) (HCC): ICD-10-CM

## 2024-06-24 DIAGNOSIS — I10 ESSENTIAL HYPERTENSION, BENIGN: ICD-10-CM

## 2024-06-24 DIAGNOSIS — R79.89 ELEVATED BRAIN NATRIURETIC PEPTIDE (BNP) LEVEL: ICD-10-CM

## 2024-06-24 DIAGNOSIS — Z95.5 STATUS POST CORONARY ARTERY STENT PLACEMENT: ICD-10-CM

## 2024-06-24 DIAGNOSIS — Z79.899 HIGH RISK MEDICATION USE: ICD-10-CM

## 2024-06-24 DIAGNOSIS — Z98.890 HISTORY OF MITRAL VALVE REPAIR: ICD-10-CM

## 2024-06-24 DIAGNOSIS — I27.20 PULMONARY HYPERTENSION (HCC): ICD-10-CM

## 2024-06-24 DIAGNOSIS — E78.5 DYSLIPIDEMIA: ICD-10-CM

## 2024-06-24 DIAGNOSIS — R06.09 DOE (DYSPNEA ON EXERTION): ICD-10-CM

## 2024-06-24 PROCEDURE — 3074F SYST BP LT 130 MM HG: CPT | Performed by: NURSE PRACTITIONER

## 2024-06-24 PROCEDURE — 99212 OFFICE O/P EST SF 10 MIN: CPT

## 2024-06-24 PROCEDURE — 3078F DIAST BP <80 MM HG: CPT | Performed by: NURSE PRACTITIONER

## 2024-06-24 PROCEDURE — 99213 OFFICE O/P EST LOW 20 MIN: CPT | Performed by: NURSE PRACTITIONER

## 2024-06-24 PROCEDURE — 99214 OFFICE O/P EST MOD 30 MIN: CPT | Performed by: NURSE PRACTITIONER

## 2024-06-24 RX ORDER — FUROSEMIDE 20 MG/1
20 TABLET ORAL DAILY
Qty: 90 TABLET | Refills: 3 | Status: SHIPPED | OUTPATIENT
Start: 2024-06-24

## 2024-06-24 RX ORDER — SPIRONOLACTONE 25 MG/1
25 TABLET ORAL DAILY
Qty: 90 TABLET | Refills: 3 | Status: SHIPPED | OUTPATIENT
Start: 2024-06-24

## 2024-06-24 ASSESSMENT — FIBROSIS 4 INDEX: FIB4 SCORE: 1.4

## 2024-06-24 NOTE — PROGRESS NOTES
[facility] - Follow Up Visit  Date of Service:06/24/24    Osman Guzmán Jr. is here for follow up of dyslipidemia.    Subjective    HPI  Pertinent Interval History since last visit:   Pt has been tolerating ezetimibe 10mg daily  Current Prescription Lipid Lowering Medications - including dose:   Statin: Rosuvastatin 10 mg daily  Non-Statin: Ezetimibe 10mg daily  Current Lipid Lowering and Related Supplements:   Omega-3: 1 capsule daily  Any Current Side Effects Potentially Related to Lipid Lowering therapy?   No  Current Adherence to Lipid Lowering Therapies:  Complete  Any Previous History of Statin Intolerance?   Yes, Details: Statin: Atorvastatin - severe myalgias  Baseline Lipids Prior to Treatment:  Baseline Lipids Prior to Treatment:     Latest Reference Range & Units 01/29/24 04:19   Cholesterol,Tot 100 - 199 mg/dL 240 (H)   Triglycerides 0 - 149 mg/dL 120   HDL >=40 mg/dL 73   LDL <100 mg/dL 143 (H)     Other Pertinent History: Pulmonary HTN  History of other CV risk factors: Mitral valve repair ~15 years ago, possible atrial fibrillation    SOCIAL HISTORY  Social History     Tobacco Use   Smoking Status Never    Passive exposure: Never   Smokeless Tobacco Never      Change in weight: Has been losing weight, down to 185lbs at home    Exercise habits: HIIT at the gym 2x per week     Diet: following a Keto diet and fasts. He eats 1 meal a day.        Objective    There were no vitals filed for this visit.     DATA REVIEW  Most Recent Lipid Panel:         Other Pertinent Blood Work:   Lab Results   Component Value Date    SODIUM 138 02/02/2024    POTASSIUM 3.7 02/02/2024    CHLORIDE 103 02/02/2024    CO2 24 02/02/2024    ANION 11.0 02/02/2024    GLUCOSE 101 (H) 02/02/2024    BUN 7 (L) 02/02/2024    CREATININE 0.78 02/02/2024    CALCIUM 9.1 02/02/2024    ASTSGOT 17 01/30/2024    ALTSGPT 27 01/30/2024    ALKPHOSPHAT 40 01/30/2024    TBILIRUBIN 0.4 01/30/2024    ALBUMIN 3.2 02/01/2024    AGRATIO 1.1  01/30/2024    TSHULTRASEN 1.840 01/29/2024       Other:  NA    Recent Imaging Studies:    None since last visit        ASSESSMENT AND PLAN  Patient Type, check all that apply:   Secondary Prevention  Established Atherosclerotic Cardiovascular Disease (ASCVD)  Yes, Details: CAD, NSTEMI, s/p NORM/PCI to distal LAD and RCA (02/01/24)  Other Established (non-atherosclerotic) Vascular Disease, if Present:    None  Evidence of Heterozygous Familial Hypercholesterolemia (FH): Unknown  ACC/AHA Indication for Statin Therapy, david all that apply:   Established ASCVD: Indication for High intensity statin    Calculated Risk for ASCVD, if applicable:  N/A  Other Significant Risk Markers, if any, david all that apply:  None  National Lipid Association (NLA) Goal (if applicable):  LDL-C:   <70 mg/dL  Lifestyle Recommendations From Today’s Visit:   Eating Plan: Concentrate on  Low sat/Trans fat and Low simple carb  Exercise: Continue HIIT 2x weekly  Statin Recommendations from Today's Visit  CONTINUE Rosuvastatin 10 mg daily  Non-Statin Medications Recommendations from Today’s Visit:   CONTINUE Ezetimibe 10 mg daily  Indication for PCSK9 Inhibitor, if applicable:  Not currently indicated  Supplements Recommended at this visit:  Continue current supplements  Recommendations for Other Cardiovascular Risk Factors, david all that apply:   Hypertension- continue to monitor BP at home; goal <130/80  Other Issues:  Pt has been tolerating ezetimibe. He has made significant changes to lifestyle as well. However no follow up labs were ordered, therefore unable to assess need to intensify lipid lowering therapy.     Studies Ordered at Todays Visit:  None   Blood Work Ordered At Today’s visit:   CMP, lipid panel  Follow-Up:  2 months since pt has another appt that day    Mary Patel, PharmD      CC:  Pcp Pt States None  Ale Brandon A.*

## 2024-07-08 LAB
CHOLEST SERPL-MCNC: 249 MG/DL
HDLC SERPL-MCNC: 50 MG/DL
LDLC SERPL CALC-MCNC: 170 MG/DL
TRIGL SERPL-MCNC: 148 MG/DL

## 2024-07-12 DIAGNOSIS — Z98.890 HISTORY OF MITRAL VALVE REPAIR: ICD-10-CM

## 2024-07-12 DIAGNOSIS — Z95.5 STATUS POST CORONARY ARTERY STENT PLACEMENT: ICD-10-CM

## 2024-07-12 DIAGNOSIS — I21.4 NSTEMI (NON-ST ELEVATED MYOCARDIAL INFARCTION) (HCC): ICD-10-CM

## 2024-07-12 DIAGNOSIS — R79.89 ELEVATED BRAIN NATRIURETIC PEPTIDE (BNP) LEVEL: ICD-10-CM

## 2024-07-12 DIAGNOSIS — Z79.899 HIGH RISK MEDICATION USE: ICD-10-CM

## 2024-07-12 DIAGNOSIS — I25.10 CORONARY ARTERY DISEASE INVOLVING NATIVE CORONARY ARTERY OF NATIVE HEART WITHOUT ANGINA PECTORIS: ICD-10-CM

## 2024-07-17 ENCOUNTER — TELEPHONE (OUTPATIENT)
Dept: CARDIOLOGY | Facility: MEDICAL CENTER | Age: 69
End: 2024-07-17
Payer: MEDICARE

## 2024-07-19 ENCOUNTER — TELEPHONE (OUTPATIENT)
Dept: CARDIOLOGY | Facility: MEDICAL CENTER | Age: 69
End: 2024-07-19
Payer: MEDICARE

## 2024-08-17 ASSESSMENT — ENCOUNTER SYMPTOMS
HEARTBURN: 0
HEADACHES: 0
DIZZINESS: 0
FEVER: 0
CHILLS: 0
PALPITATIONS: 0

## 2024-08-17 NOTE — PROGRESS NOTES
Pulmonary Hypertension Clinic - Follow up    Date of Service: 8/19/24    Referring Physician: Ale Brandon A.*    Reason for Consult: MARTINEZ, pulmonary hypertension 2/2 NSTEMI, mitral valve disease    Chief Complaint:   Chief Complaint   Patient presents with    Follow-Up     Pulm. HTN. Last seen 06/24/24    Results     CXR 05/16/24, ECHO 05/14/24, VQ Scan 05/16/24,Labs 07/08/24       HPI:   Osman Guzmán Jr. is a 68 y.o. male who is followed by Ale Brandon and is referred to the pulmonary clinic for MARTINEZ, pulmonary hypertension 2/2 NSTEMI, mitral valve disease, last seen 5/13/24. Osman gives a detailed history of feeling fine and having excellent exercise tolerance until this started to decline about 5 years ago.  He c/o left sided chest pain and underwent LHC with Stents placed to the LAD (80%) and RCA (95%) in February.  He states his left sided chest pain has now been replaced with a central pressure like chest pain he feels if from his lungs.  He is a never smoker, denies methamphetamine use and denies any diet pill use in the past.  He is a retired  with many different chemical and fuel exposures over the years, now retired.  He states he used to be able to do FIT (HIT) training for 5 minutes at a time and now he cannot do more than 1 minute.  This is worse since his heart cath.  On his heart cath he was noted to have an elevated PA pressure with mPAP of 44 with PVR of 7 wood units which is elevated, no vasodilator response.  His wedge was 12mmHg and LVEDP was 13mmHg both of which are high-normal. He denies GERD or significant seasonal allergies.  He does report he wheezes with exercise which could potentially indicate exercise induced asthma or could represent cardiac wheeze.  He denies any articular pain or rashes and has no family history of rheumatologic disease. He has no known history of VTE.      PFTs are completely normal with supranormal DLCO which is near impossible with  pulmonary hypertension of any significance.     Today 8/19/24 - Osman is exercising twice a day and boxing several times a week.  He denies any respiratory limitations.  He feels great, much better since stent placement.  VQ scan is negative for chronic thromboembolic disease. He remains on 20mg of lasix and 25mg of spironolactone with a bump in creatinine on recent labs but very well optimized BNP.  He did not get his CTD panel done but will get this drawn today.      Past Medical History:   Diagnosis Date    Blood in urine     Chest pain     Chest tightness     Chickenpox     Coronary heart disease     Cough     Daytime sleepiness     Difficulty breathing     Dizziness     Hearing difficulty     Heart attack (HCC)     History of mitral valve repair     Hypertension     Obesity     Painful breathing     Palpitations     Shortness of breath     Tonsillitis     Valvular heart disease     Vision loss     Wears glasses     Wheezing        Past Surgical History:   Procedure Laterality Date    AORTIC VALVE REPLACEMENT         Social History     Socioeconomic History    Marital status: Single     Spouse name: Not on file    Number of children: Not on file    Years of education: Not on file    Highest education level: Not on file   Occupational History    Not on file   Tobacco Use    Smoking status: Never     Passive exposure: Never    Smokeless tobacco: Never   Vaping Use    Vaping status: Never Used   Substance and Sexual Activity    Alcohol use: Not Currently    Drug use: Never    Sexual activity: Not on file   Other Topics Concern    Not on file   Social History Narrative    Not on file     Social Determinants of Health     Financial Resource Strain: Not on file   Food Insecurity: Not on file   Transportation Needs: Not on file   Physical Activity: Not on file   Stress: Not on file   Social Connections: Not on file   Intimate Partner Violence: Not on file   Housing Stability: Not on file          No family history on  file.    Current Outpatient Medications on File Prior to Visit   Medication Sig Dispense Refill    spironolactone (ALDACTONE) 25 MG Tab Take 1 Tablet by mouth every day. 90 Tablet 3    furosemide (LASIX) 20 MG Tab Take 1 Tablet by mouth every day. 90 Tablet 3    MAGNESIUM PO Take  by mouth every day. W/POTASSIUM, (DRINK)      albuterol 108 (90 Base) MCG/ACT Aero Soln inhalation aerosol Inhale 2 Puffs every 6 hours as needed for Shortness of Breath. 8.5 g 6    ezetimibe (ZETIA) 10 MG Tab Take 1 Tablet by mouth every day. 30 Tablet 3    carvedilol (COREG) 12.5 MG Tab Take 1 Tablet by mouth 2 times a day with meals. 180 Tablet 3    clopidogrel (PLAVIX) 75 MG Tab Take 1 Tablet by mouth every day. 90 Tablet 3    aspirin 81 MG EC tablet Take 1 Tablet by mouth every day. 90 Tablet 3    lisinopril (PRINIVIL) 40 MG tablet Take 1 Tablet by mouth every day. 90 Tablet 3    rosuvastatin (CRESTOR) 10 MG Tab Take 1 Tablet by mouth every evening. 90 Tablet 3    nitroglycerin (NITROSTAT) 0.4 MG SL Tab Place 1 Tablet under the tongue as needed for Chest Pain (every 5 minutes for persistent chest pain. After 3rd dose, call 911). 25 Tablet 5    Niacinamide-Zn-Cu-Rdmwck-Gg-Vg (NICOTINAMIDE PO) Take 1 Tablet by mouth every day. Nicotinomide mononitrate -- natural supplement for Alzheimer's      Medium Chain Triglycerides (MCT) Oil Take 1 Each by mouth every day.       No current facility-administered medications on file prior to visit.       Allergies: Lipitor [atorvastatin]      ROS:   Review of Systems   Constitutional:  Negative for chills, fever and malaise/fatigue.   HENT:  Negative for congestion.    Respiratory:  Negative for cough, sputum production, shortness of breath and wheezing.    Cardiovascular:  Negative for chest pain, palpitations and leg swelling.   Gastrointestinal:  Negative for heartburn.   Musculoskeletal:  Negative for joint pain.   Neurological:  Negative for dizziness and headaches.   Endo/Heme/Allergies:   "Negative for environmental allergies.       Vitals:  /78 (BP Location: Right arm, Patient Position: Sitting, BP Cuff Size: Adult)   Pulse 68   Ht 1.753 m (5' 9\")   Wt 86.6 kg (191 lb)   SpO2 93%     Physical Exam:  Physical Exam  Constitutional:       Appearance: Normal appearance.   HENT:      Head: Atraumatic.   Eyes:      Extraocular Movements: Extraocular movements intact.   Cardiovascular:      Rate and Rhythm: Normal rate and regular rhythm.      Heart sounds: Normal heart sounds.   Pulmonary:      Effort: Pulmonary effort is normal. No respiratory distress.      Breath sounds: Normal breath sounds. No wheezing or rales.   Abdominal:      General: Abdomen is flat.   Musculoskeletal:         General: No swelling.   Skin:     General: Skin is warm and dry.   Neurological:      General: No focal deficit present.      Mental Status: He is alert.             Pertinent Studies:  Laboratory Data:    6MWT:  321m without supplemental O2  2/15/24    PFTs as reviewed by me personally show:        Imaging as reviewed by me personally show:    V/Q 5/16/24:  FINDINGS:  Ventilation images are unremarkable.  Perfusion images are unremarkable.     IMPRESSION:     Unremarkable exam    CTA:  IMPRESSION:     1.  No CT evidence of pulmonary embolism.     2.  There is calcific atherosclerosis including the coronary arteries.  Pertinent Cardiac Studies:  Echo:  1/28/24  CONCLUSIONS  Normal left ventricular systolic function.  The left ventricular ejection fraction is visually estimated to be 55-  60%.  There is mild flattening of the interventricular septum.  Normal diastolic function.  The right ventricle is severely dilated with reduced systolic function.  Estimated right ventricular systolic pressure is 50 mmHg + right atrial   pressure.  The inferior vena cava is not well visualized.  Mild tricuspid regurgitation.    LHC/RHC: 1/28/24  Findings:  Left main-long left main, no significant stenosis.  Left anterior " descending artery-midportion is calcified with 50% stenosis after diagonal bifurcation, IVUS showed circumferential calcium but minimal luminal area is adequate 7-8 mm².  Distal LAD has focal 80% stenosis  Left circumflex artery-small left circumflex artery system, no significant stenosis in the marginal branch  Right coronary artery-large vessel, 95% stenosis in the distal portion.     Hemodynamics:  Aortic pressure 136 x 69 with a mean of 116  Heart rate 75 bpm  Pulmonary arterial pressure 79/27 with a mean of 44  Pulmonary capillary wedge pressure 12 mmHg  Right ventricular pressure 63/11 with a EDP 13  Right atrial pressure 11 mmHg  Left ventricular end-diastolic pressure 13 mmHg  Cardiac output 4.7 L/min  Cardiac index 2.33 L/min/m2 by thermodilution method.  Pulmonary vascular resistance 7 Wood units  Manuelito 4.73, favorable  With adenosine no significant response with pulmonary arterial pressures.    Assessment/Plan:    Problem List Items Addressed This Visit       Pulmonary hypertension (HCC)     Pulmonary Hypertension 2/2 Left heart disease (Group II) suspected.  He may have some RADS when he exercises which I have addressed however would not cause PH.  Pulmonary function testing is not consistent with group III pulmonary hypertension.  CTA is negative for any suggestion of ILD as well as acute VTE.  VQ scan rules out CTEPH.  He has no history of drug abuse or diet pill use.    WHO Groups:  I. CTD testing pending, no other risk factors for PAH. Wedge 12, mPAP 44, RAP 11. LVEDP 13.   II. Mitral valve disease, recent stenting  III. No pulmonary causes of PH identified  IV. VQ scan WNL  V. No known risk factors for group V PH.   Although the cath does appear to be consistent with pre-capillary PH he also has significant left sided disease and his age and lack of risk factors for PAH make a diagnosis of Group II PH more likely.  He is functionally excellent at the point and I would not start therapy regardless of  the group at the moment.  Addition of PH therapies would likely cause more problems then it would solve however if he becomes symptomatic and all left sided disease is optimized we could trial some low dose PH specific therapy.   Plan:  - Albuterol as needed prior to exercise  - Consider changing lisinopril to ARB due to cough  - CTD panel blood work, BNP, BMP, RF and ROMEL  - Repeat Echo to assess for recovery after stent  - At this point there is no indication for PH specific therapies   - Follow up in 6 months         Relevant Orders    Basic Metabolic Panel    proBrain Natriuretic Peptide, NT    RHEUMATOID ARTHRITIS FACTOR    ROMEL REFLEXIVE PROFILE    CCP    EC-ECHOCARDIOGRAM COMPLETE W/O CONT          Return in about 6 months (around 2/19/2025) for Dr. Gannon with pulmonary hypertension .     This note was generated using voice recognition software which has a chance of producing errors of grammar and possibly content.  I have made every reasonable attempt to find and correct any obvious errors, but it should be expected that some may not be found prior to finalization of this note.    Time spent in record review prior to patient arrival, reviewing results, and in face-to-face encounter totaled 45 min, excluding any procedures if performed.      Shirin Gannon MD RD  Pulmonary and Critical Care Medicine  [facility]

## 2024-08-19 ENCOUNTER — HOSPITAL ENCOUNTER (OUTPATIENT)
Dept: LAB | Facility: MEDICAL CENTER | Age: 69
End: 2024-08-19
Attending: INTERNAL MEDICINE
Payer: MEDICARE

## 2024-08-19 ENCOUNTER — OFFICE VISIT (OUTPATIENT)
Dept: SLEEP MEDICINE | Facility: MEDICAL CENTER | Age: 69
End: 2024-08-19
Attending: INTERNAL MEDICINE
Payer: MEDICARE

## 2024-08-19 VITALS
HEART RATE: 68 BPM | WEIGHT: 191 LBS | OXYGEN SATURATION: 93 % | DIASTOLIC BLOOD PRESSURE: 78 MMHG | HEIGHT: 69 IN | SYSTOLIC BLOOD PRESSURE: 128 MMHG | BODY MASS INDEX: 28.29 KG/M2

## 2024-08-19 DIAGNOSIS — I27.20 PULMONARY HYPERTENSION (HCC): ICD-10-CM

## 2024-08-19 LAB
ANION GAP SERPL CALC-SCNC: 12 MMOL/L (ref 7–16)
BUN SERPL-MCNC: 15 MG/DL (ref 8–22)
CALCIUM SERPL-MCNC: 9.9 MG/DL (ref 8.5–10.5)
CHLORIDE SERPL-SCNC: 105 MMOL/L (ref 96–112)
CO2 SERPL-SCNC: 21 MMOL/L (ref 20–33)
CREAT SERPL-MCNC: 1.18 MG/DL (ref 0.5–1.4)
GFR SERPLBLD CREATININE-BSD FMLA CKD-EPI: 67 ML/MIN/1.73 M 2
GLUCOSE SERPL-MCNC: 118 MG/DL (ref 65–99)
POTASSIUM SERPL-SCNC: 5 MMOL/L (ref 3.6–5.5)
SODIUM SERPL-SCNC: 138 MMOL/L (ref 135–145)

## 2024-08-19 PROCEDURE — 99213 OFFICE O/P EST LOW 20 MIN: CPT | Performed by: INTERNAL MEDICINE

## 2024-08-19 PROCEDURE — 3074F SYST BP LT 130 MM HG: CPT | Performed by: INTERNAL MEDICINE

## 2024-08-19 PROCEDURE — 80048 BASIC METABOLIC PNL TOTAL CA: CPT

## 2024-08-19 PROCEDURE — 3078F DIAST BP <80 MM HG: CPT | Performed by: INTERNAL MEDICINE

## 2024-08-19 PROCEDURE — 36415 COLL VENOUS BLD VENIPUNCTURE: CPT

## 2024-08-19 PROCEDURE — 99215 OFFICE O/P EST HI 40 MIN: CPT | Performed by: INTERNAL MEDICINE

## 2024-08-19 ASSESSMENT — ENCOUNTER SYMPTOMS
SPUTUM PRODUCTION: 0
COUGH: 0
SHORTNESS OF BREATH: 0
WHEEZING: 0

## 2024-08-19 ASSESSMENT — FIBROSIS 4 INDEX: FIB4 SCORE: 1.4

## 2024-08-19 NOTE — ASSESSMENT & PLAN NOTE
Pulmonary Hypertension 2/2 Left heart disease (Group II) suspected.  He may have some RADS when he exercises which I have addressed however would not cause PH.  Pulmonary function testing is not consistent with group III pulmonary hypertension.  CTA is negative for any suggestion of ILD as well as acute VTE.  VQ scan rules out CTEPH.  He has no history of drug abuse or diet pill use.    WHO Groups:  I. CTD testing pending, no other risk factors for PAH. Wedge 12, mPAP 44, RAP 11. LVEDP 13.   II. Mitral valve disease, recent stenting  III. No pulmonary causes of PH identified  IV. VQ scan WNL  V. No known risk factors for group V PH.   Although the cath does appear to be consistent with pre-capillary PH he also has significant left sided disease and his age and lack of risk factors for PAH make a diagnosis of Group II PH more likely.  He is functionally excellent at the point and I would not start therapy regardless of the group at the moment.  Addition of PH therapies would likely cause more problems then it would solve however if he becomes symptomatic and all left sided disease is optimized we could trial some low dose PH specific therapy.   Plan:  - Albuterol as needed prior to exercise  - Consider changing lisinopril to ARB due to cough  - CTD panel blood work, BNP, BMP, RF and ROMEL  - Repeat Echo to assess for recovery after stent  - At this point there is no indication for PH specific therapies   - Follow up in 6 months

## 2024-08-20 ENCOUNTER — TELEPHONE (OUTPATIENT)
Dept: VASCULAR LAB | Facility: MEDICAL CENTER | Age: 69
End: 2024-08-20
Payer: MEDICARE

## 2024-08-20 NOTE — TELEPHONE ENCOUNTER
[facility]    Pt cancelled appointment for lipid follow up. He Does not want to make an appointment at this time, declining future care.  Pt reports he will contact our clinic in the future should he want to establish again.    Aurelio Mendoza, PharmD     CC  Ale TOBAR

## 2024-09-04 PROBLEM — I25.83 CORONARY ARTERY DISEASE DUE TO LIPID RICH PLAQUE: Status: ACTIVE | Noted: 2024-01-28

## 2024-09-04 PROBLEM — I25.10 CORONARY ARTERY DISEASE DUE TO LIPID RICH PLAQUE: Status: ACTIVE | Noted: 2024-01-28

## 2024-09-09 ENCOUNTER — OFFICE VISIT (OUTPATIENT)
Dept: CARDIOLOGY | Facility: MEDICAL CENTER | Age: 69
End: 2024-09-09
Attending: INTERNAL MEDICINE
Payer: MEDICARE

## 2024-09-09 VITALS
DIASTOLIC BLOOD PRESSURE: 78 MMHG | BODY MASS INDEX: 27.11 KG/M2 | OXYGEN SATURATION: 96 % | WEIGHT: 183 LBS | RESPIRATION RATE: 16 BRPM | SYSTOLIC BLOOD PRESSURE: 126 MMHG | HEIGHT: 69 IN | HEART RATE: 77 BPM

## 2024-09-09 DIAGNOSIS — R79.89 ELEVATED BRAIN NATRIURETIC PEPTIDE (BNP) LEVEL: ICD-10-CM

## 2024-09-09 DIAGNOSIS — I25.84 CORONARY ARTERY DISEASE DUE TO CALCIFIED CORONARY LESION: ICD-10-CM

## 2024-09-09 DIAGNOSIS — I25.10 CORONARY ARTERY DISEASE DUE TO CALCIFIED CORONARY LESION: ICD-10-CM

## 2024-09-09 DIAGNOSIS — Z98.890 HISTORY OF MITRAL VALVE REPAIR: ICD-10-CM

## 2024-09-09 DIAGNOSIS — I27.20 PULMONARY HYPERTENSION (HCC): ICD-10-CM

## 2024-09-09 PROCEDURE — 99213 OFFICE O/P EST LOW 20 MIN: CPT | Performed by: INTERNAL MEDICINE

## 2024-09-09 PROCEDURE — 99214 OFFICE O/P EST MOD 30 MIN: CPT | Performed by: INTERNAL MEDICINE

## 2024-09-09 PROCEDURE — 3074F SYST BP LT 130 MM HG: CPT | Performed by: INTERNAL MEDICINE

## 2024-09-09 PROCEDURE — G2211 COMPLEX E/M VISIT ADD ON: HCPCS | Performed by: INTERNAL MEDICINE

## 2024-09-09 PROCEDURE — 3078F DIAST BP <80 MM HG: CPT | Performed by: INTERNAL MEDICINE

## 2024-09-09 RX ORDER — ROSUVASTATIN CALCIUM 20 MG/1
20 TABLET, COATED ORAL EVERY EVENING
Qty: 100 TABLET | Refills: 3 | Status: SHIPPED | OUTPATIENT
Start: 2024-09-09

## 2024-09-09 ASSESSMENT — FIBROSIS 4 INDEX: FIB4 SCORE: 1.4

## 2024-09-09 NOTE — PROGRESS NOTES
"CARDIOLOGY OUTPATIENT FOLLOWUP    PCP: Adrien Fuentes    1. Coronary artery disease due to calcified coronary lesion    2. Pulmonary hypertension (HCC)    3. History of mitral valve repair    4. Elevated brain natriuretic peptide (BNP) level        Osman Guzmán Jr. is clinically well from a cardiovascular standpoint.  I did review with him the recommendation for using rosuvastatin at bedtime which she has not been doing and increase the dosage to 20 mg nightly.  He has also made dietary changes since the last lipid panel and we will then repeat a lipid profile in 1 month.    He will continue on the present medication regimen and we will reassess the ongoing need for the variety of agents this coming January.    I concur with Dr. Gannon's as impression that pulmonary hypertension is related to residual pulmonary vascular disease not corrected by mitral valve surgery.  I will however obtain records from [place] to see if there was documented PH prior to MV surgery    Follow up: 4 months    History: Osman Guzmán Jr. is a 69 y.o. male with history of mitral valve repair 8 years ago presenting for follow-up of coronary artery disease with MI and two-vessel PCI, normal LVEF February 2024.  He is also noted to have pulmonary hypertension, PVR of 5 Wood units and normal wedge pressure.    He feels well.  No cardiac symptoms    Physical Exam:  /78 (BP Location: Left arm, Patient Position: Sitting, BP Cuff Size: Adult)   Pulse 77   Resp 16   Ht 1.753 m (5' 9\")   Wt 83 kg (183 lb)   SpO2 96%   BMI 27.02 kg/m²   GEN: NAD  RESP: CTAB  CVS: RRR, No M/R/G.  Prominent P2  ABD: Soft, NT/ND  EXT: WWP, no edema    Studies interpreted by me: Echo:  Significant RVH  () Today's E/M visit is associated with medical care services that serve as the continuing focal point for all needed health care services and/or with medical care services that  are part of ongoing care related to a patient's single, serious " condition, or a complex condition: This includes  furnishing services to patients on an ongoing basis that result in care that is personalized  to the patient. The services result in a comprehensive, longitudinal, and continuous  relationship with the patient and involve delivery of team-based care that is accessible, coordinated with other practitioners and providers, and integrated with the broader health  care landscape.     The ASCVD Risk score (Timothy ZAPATA, et al., 2019) failed to calculate.    Studies  Lab Results   Component Value Date/Time    CHOLSTRLTOT 249 07/08/2024 12:00 AM     07/08/2024 12:00 AM    HDL 50 07/08/2024 12:00 AM    TRIGLYCERIDE 148 07/08/2024 12:00 AM       Lab Results   Component Value Date/Time    SODIUM 138 08/19/2024 09:30 AM    POTASSIUM 5.0 08/19/2024 09:30 AM    CHLORIDE 105 08/19/2024 09:30 AM    CO2 21 08/19/2024 09:30 AM    GLUCOSE 118 (H) 08/19/2024 09:30 AM    BUN 15 08/19/2024 09:30 AM    CREATININE 1.18 08/19/2024 09:30 AM      Lab Results   Component Value Date/Time    PROTHROMBTM 13.3 01/28/2024 11:10 AM    INR 1.00 01/28/2024 11:10 AM      Lab Results   Component Value Date/Time    WBC 7.6 02/02/2024 06:51 AM    RBC 4.71 02/02/2024 06:51 AM    HEMOGLOBIN 14.2 02/02/2024 06:51 AM    HEMATOCRIT 41.4 (L) 02/02/2024 06:51 AM    MCV 87.9 02/02/2024 06:51 AM    MCH 30.1 02/02/2024 06:51 AM    MCHC 34.3 02/02/2024 06:51 AM    MPV 10.5 02/02/2024 06:51 AM    NEUTSPOLYS 74.60 (H) 01/30/2024 03:06 AM    LYMPHOCYTES 13.30 (L) 01/30/2024 03:06 AM    MONOCYTES 9.40 01/30/2024 03:06 AM    EOSINOPHILS 1.90 01/30/2024 03:06 AM    BASOPHILS 0.40 01/30/2024 03:06 AM        Past Medical History:   Diagnosis Date    Blood in urine     Chest pain     Chest tightness     Chickenpox     Coronary heart disease     Cough     Daytime sleepiness     Difficulty breathing     Dizziness     Hearing difficulty     Heart attack (HCC)     History of mitral valve repair     Hypertension     Obesity      Painful breathing     Palpitations     Shortness of breath     Tonsillitis     Valvular heart disease     Vision loss     Wears glasses     Wheezing      Allergies   Allergen Reactions    Lipitor [Atorvastatin]      Severe muscle crumpling        Outpatient Encounter Medications as of 9/9/2024   Medication Sig Dispense Refill    rosuvastatin (CRESTOR) 20 MG Tab Take 1 Tablet by mouth every evening. 100 Tablet 3    spironolactone (ALDACTONE) 25 MG Tab Take 1 Tablet by mouth every day. 90 Tablet 3    furosemide (LASIX) 20 MG Tab Take 1 Tablet by mouth every day. 90 Tablet 3    MAGNESIUM PO Take  by mouth every day. W/POTASSIUM, (DRINK)      ezetimibe (ZETIA) 10 MG Tab Take 1 Tablet by mouth every day. 30 Tablet 3    carvedilol (COREG) 12.5 MG Tab Take 1 Tablet by mouth 2 times a day with meals. 180 Tablet 3    clopidogrel (PLAVIX) 75 MG Tab Take 1 Tablet by mouth every day. 90 Tablet 3    aspirin 81 MG EC tablet Take 1 Tablet by mouth every day. 90 Tablet 3    lisinopril (PRINIVIL) 40 MG tablet Take 1 Tablet by mouth every day. 90 Tablet 3    nitroglycerin (NITROSTAT) 0.4 MG SL Tab Place 1 Tablet under the tongue as needed for Chest Pain (every 5 minutes for persistent chest pain. After 3rd dose, call 911). 25 Tablet 5    Niacinamide-Zn-Cu-Gjafvt-Zk-Qg (NICOTINAMIDE PO) Take 1 Tablet by mouth every day. Nicotinomide mononitrate -- natural supplement for Alzheimer's      Medium Chain Triglycerides (MCT) Oil Take 1 Each by mouth every day.      albuterol 108 (90 Base) MCG/ACT Aero Soln inhalation aerosol Inhale 2 Puffs every 6 hours as needed for Shortness of Breath. (Patient not taking: Reported on 9/9/2024) 8.5 g 6    [DISCONTINUED] rosuvastatin (CRESTOR) 10 MG Tab Take 1 Tablet by mouth every evening. 90 Tablet 3     No facility-administered encounter medications on file as of 9/9/2024.     Social History     Socioeconomic History    Marital status: Single     Spouse name: Not on file    Number of children: Not on  file    Years of education: Not on file    Highest education level: Not on file   Occupational History    Not on file   Tobacco Use    Smoking status: Never     Passive exposure: Never    Smokeless tobacco: Never   Vaping Use    Vaping status: Never Used   Substance and Sexual Activity    Alcohol use: Not Currently    Drug use: Never    Sexual activity: Not on file   Other Topics Concern    Not on file   Social History Narrative    Not on file     Social Determinants of Health     Financial Resource Strain: Not on file   Food Insecurity: Not on file   Transportation Needs: Not on file   Physical Activity: Not on file   Stress: Not on file   Social Connections: Not on file   Intimate Partner Violence: Not on file   Housing Stability: Not on file         ROS:   10 point review systems is otherwise negative except as per the HPI    Chief Complaint   Patient presents with    Follow-Up     F/v Dx: NSTEMI (non-ST elevated myocardial infarction) (HCC)    Coronary Artery Disease     Coronary artery disease due to lipid rich plaque       Other     History of mitral valve repair

## 2024-09-19 ENCOUNTER — HOSPITAL ENCOUNTER (OUTPATIENT)
Dept: CARDIOLOGY | Facility: MEDICAL CENTER | Age: 69
End: 2024-09-19
Attending: INTERNAL MEDICINE
Payer: MEDICARE

## 2024-09-19 DIAGNOSIS — I27.20 PULMONARY HYPERTENSION (HCC): ICD-10-CM

## 2024-09-19 LAB
LV EJECT FRACT  99904: 55
LV EJECT FRACT MOD 2C 99903: 49.76
LV EJECT FRACT MOD 4C 99902: 64.05
LV EJECT FRACT MOD BP 99901: 59.82

## 2024-09-19 PROCEDURE — 93306 TTE W/DOPPLER COMPLETE: CPT | Mod: 26 | Performed by: INTERNAL MEDICINE

## 2024-09-19 PROCEDURE — 93306 TTE W/DOPPLER COMPLETE: CPT

## 2024-10-08 ENCOUNTER — TELEPHONE (OUTPATIENT)
Dept: CARDIOLOGY | Facility: MEDICAL CENTER | Age: 69
End: 2024-10-08
Payer: MEDICARE

## 2025-01-27 ENCOUNTER — OFFICE VISIT (OUTPATIENT)
Dept: SLEEP MEDICINE | Facility: MEDICAL CENTER | Age: 70
End: 2025-01-27
Payer: MEDICARE

## 2025-01-27 VITALS
HEART RATE: 62 BPM | BODY MASS INDEX: 26.51 KG/M2 | WEIGHT: 179 LBS | OXYGEN SATURATION: 97 % | DIASTOLIC BLOOD PRESSURE: 76 MMHG | HEIGHT: 69 IN | SYSTOLIC BLOOD PRESSURE: 148 MMHG

## 2025-01-27 DIAGNOSIS — I25.83 CORONARY ARTERY DISEASE DUE TO LIPID RICH PLAQUE: ICD-10-CM

## 2025-01-27 DIAGNOSIS — I25.10 CORONARY ARTERY DISEASE DUE TO LIPID RICH PLAQUE: ICD-10-CM

## 2025-01-27 DIAGNOSIS — I27.20 PULMONARY HYPERTENSION (HCC): Primary | ICD-10-CM

## 2025-01-27 DIAGNOSIS — I10 PRIMARY HYPERTENSION: ICD-10-CM

## 2025-01-27 PROCEDURE — 99213 OFFICE O/P EST LOW 20 MIN: CPT

## 2025-01-27 PROCEDURE — 99215 OFFICE O/P EST HI 40 MIN: CPT

## 2025-01-27 ASSESSMENT — ENCOUNTER SYMPTOMS
COUGH: 0
SPUTUM PRODUCTION: 0
SHORTNESS OF BREATH: 0
WHEEZING: 0
HEARTBURN: 0
CHILLS: 0
HEADACHES: 0
DIZZINESS: 0
PALPITATIONS: 0
DEPRESSION: 0
FEVER: 0

## 2025-01-27 ASSESSMENT — FIBROSIS 4 INDEX: FIB4 SCORE: 1.4

## 2025-01-27 NOTE — PROGRESS NOTES
Pulmonary Hypertension Clinic - Follow up    Date of Service: 1/27/2025     Referring Physician: Ale Brandon A.*    Reason for Consult: MARTINEZ, pulmonary hypertension 2/2 NSTEMI, mitral valve disease    Chief Complaint:   Chief Complaint   Patient presents with    Follow-Up     Pulmonary HTN. Last seen 08/19/24      Results     Labs 08/19/24, echo 09/19/24     HPI:     Osman Guzmán Jr. is a 68 y.o. male who is followed by Ale Brandon and is referred to the pulmonary clinic for MARTINEZ, pulmonary hypertension 2/2 NSTEMI, mitral valve disease, last seen 5/13/24. Osman gives a detailed history of feeling fine and having excellent exercise tolerance until this started to decline about 5 years ago.  He c/o left sided chest pain and underwent LHC with Stents placed to the LAD (80%) and RCA (95%) in February.  He states his left sided chest pain has now been replaced with a central pressure like chest pain he feels if from his lungs.  He is a never smoker, denies methamphetamine use and denies any diet pill use in the past.  He is a retired  with many different chemical and fuel exposures over the years, now retired.  He states he used to be able to do FIT (HIT) training for 5 minutes at a time and now he cannot do more than 1 minute.  This is worse since his heart cath.  On his heart cath he was noted to have an elevated PA pressure with mPAP of 44 with PVR of 7 wood units which is elevated, no vasodilator response.  His wedge was 12mmHg and LVEDP was 13mmHg both of which are high-normal. He denies GERD or significant seasonal allergies.  He does report he wheezes with exercise which could potentially indicate exercise induced asthma or could represent cardiac wheeze.  He denies any articular pain or rashes and has no family history of rheumatologic disease. He has no known history of VTE.      PFTs are completely normal with supranormal DLCO which is near impossible with pulmonary hypertension  of any significance.     8/19/24 - Osman is exercising twice a day and boxing several times a week.  He denies any respiratory limitations.  He feels great, much better since stent placement.  VQ scan is negative for chronic thromboembolic disease. He remains on 20mg of lasix and 25mg of spironolactone with a bump in creatinine on recent labs but very well optimized BNP.  He did not get his CTD panel done but will get this drawn today.      Today 1/27/2025: Patient presents to f/u on CTD panel which he did not get done. Will re-order today. Patient's previously noted creatinine elevation and GFR decrease is being evaluated by his PCP per patient report, he has a renal US scheduled this week. Patient reports an improvement in his exercise tolerance since the previous visit. Patient now can workout with boxing 5 x per week and takes long walks daily. Patient's RVSP improved from 75 mmHg to 50 mmHg on most recent echocardiogram. Patient has no LE edema or JVD present.     REVEAL LITE 2 RISK: SCORE OF 2, LOW RISK  ProBNP 268 in July 2024    Past Medical History:   Diagnosis Date    Blood in urine     Chest pain     Chest tightness     Chickenpox     Coronary heart disease     Cough     Daytime sleepiness     Difficulty breathing     Dizziness     Hearing difficulty     Heart attack (HCC)     History of mitral valve repair     Hypertension     Obesity     Painful breathing     Palpitations     Shortness of breath     Tonsillitis     Valvular heart disease     Vision loss     Wears glasses     Wheezing        Past Surgical History:   Procedure Laterality Date    AORTIC VALVE REPLACEMENT         Social History     Socioeconomic History    Marital status: Single     Spouse name: Not on file    Number of children: Not on file    Years of education: Not on file    Highest education level: Not on file   Occupational History    Not on file   Tobacco Use    Smoking status: Never     Passive exposure: Never    Smokeless tobacco:  Never   Vaping Use    Vaping status: Never Used   Substance and Sexual Activity    Alcohol use: Not Currently    Drug use: Never    Sexual activity: Not on file   Other Topics Concern    Not on file   Social History Narrative    Not on file     Social Drivers of Health     Financial Resource Strain: Not on file   Food Insecurity: Not on file   Transportation Needs: Not on file   Physical Activity: Not on file   Stress: Not on file   Social Connections: Not on file   Intimate Partner Violence: Not on file   Housing Stability: Not on file          No family history on file.    Current Outpatient Medications on File Prior to Visit   Medication Sig Dispense Refill    rosuvastatin (CRESTOR) 20 MG Tab Take 1 Tablet by mouth every evening. 100 Tablet 3    spironolactone (ALDACTONE) 25 MG Tab Take 1 Tablet by mouth every day. 90 Tablet 3    furosemide (LASIX) 20 MG Tab Take 1 Tablet by mouth every day. 90 Tablet 3    MAGNESIUM PO Take  by mouth every day. W/POTASSIUM, (DRINK)      albuterol 108 (90 Base) MCG/ACT Aero Soln inhalation aerosol Inhale 2 Puffs every 6 hours as needed for Shortness of Breath. (Patient not taking: Reported on 9/9/2024) 8.5 g 6    ezetimibe (ZETIA) 10 MG Tab Take 1 Tablet by mouth every day. 30 Tablet 3    carvedilol (COREG) 12.5 MG Tab Take 1 Tablet by mouth 2 times a day with meals. 180 Tablet 3    clopidogrel (PLAVIX) 75 MG Tab Take 1 Tablet by mouth every day. 90 Tablet 3    aspirin 81 MG EC tablet Take 1 Tablet by mouth every day. 90 Tablet 3    lisinopril (PRINIVIL) 40 MG tablet Take 1 Tablet by mouth every day. 90 Tablet 3    nitroglycerin (NITROSTAT) 0.4 MG SL Tab Place 1 Tablet under the tongue as needed for Chest Pain (every 5 minutes for persistent chest pain. After 3rd dose, call 911). 25 Tablet 5    Niacinamide-Zn-Cu-Hvywbk-Fu-Ah (NICOTINAMIDE PO) Take 1 Tablet by mouth every day. Nicotinomide mononitrate -- natural supplement for Alzheimer's      Medium Chain Triglycerides (MCT) Oil  "Take 1 Each by mouth every day.       No current facility-administered medications on file prior to visit.     Allergies: Lipitor [atorvastatin]    ROS:   Review of Systems   Constitutional:  Negative for chills, fever and malaise/fatigue.   HENT:  Negative for congestion.    Respiratory:  Negative for cough, sputum production, shortness of breath and wheezing.    Cardiovascular:  Negative for chest pain, palpitations and leg swelling.   Gastrointestinal:  Negative for heartburn.   Musculoskeletal:  Negative for joint pain.   Neurological:  Negative for dizziness and headaches.   Endo/Heme/Allergies:  Negative for environmental allergies.   Psychiatric/Behavioral:  Negative for depression.      Vitals:  Encounter Vitals  Standard Vitals  Blood Pressure : (!) 148/76 (patient did not take his BP meds this morning)  Pulse: 62  Pulse Oximetry: 97 %  Weight: 81.2 kg (179 lb)  Height: 175.3 cm (5' 9\")  BMI (Calculated): 26.43    Physical Exam:  Physical Exam  Constitutional:       Appearance: Normal appearance.   HENT:      Head: Atraumatic.   Eyes:      Extraocular Movements: Extraocular movements intact.   Cardiovascular:      Rate and Rhythm: Normal rate and regular rhythm.      Heart sounds: Normal heart sounds.   Pulmonary:      Effort: Pulmonary effort is normal. No respiratory distress.      Breath sounds: Normal breath sounds. No wheezing or rales.   Abdominal:      General: Abdomen is flat.   Musculoskeletal:         General: No swelling.   Skin:     General: Skin is warm and dry.   Neurological:      General: No focal deficit present.      Mental Status: He is alert.       Pertinent Studies:  Laboratory Data:    ProBNP Trend: 268 on 7/8/2024 (Previously 614 on 1/8/2024)    6MWT:  321m without supplemental O2  2/15/24    PFTs 4/4/2024: as reviewed by me personally show:    Interpretation;      There is no significant obstruction or restriction.  No significant bronchodilator response.  Diffusion testing was not " performed.    Imaging as reviewed by me personally show:      V/Q 5/16/24:  FINDINGS:  Ventilation images are unremarkable.  Perfusion images are unremarkable.     IMPRESSION:     Unremarkable exam    CTA:  IMPRESSION:     1.  No CT evidence of pulmonary embolism.     2.  There is calcific atherosclerosis including the coronary arteries.    Pertinent Cardiac Studies:    Echo 9/19/2024:    CONCLUSIONS  Compared to the prior study on 05/14/24,RVSP is lower now 50 (previously 75 in May 2024)  The left ventricular ejection fraction is visually estimated to be 55-  60%.  The right ventricle is dilated.  Known mitral valve repair which is functioning normally with   appropriate transvalvular gradient.  No mitral regurgitation.  Estimated right ventricular systolic pressure is 50 mmHg.    LHC/RHC: 1/28/24  Findings:  Left main-long left main, no significant stenosis.  Left anterior descending artery-midportion is calcified with 50% stenosis after diagonal bifurcation, IVUS showed circumferential calcium but minimal luminal area is adequate 7-8 mm².  Distal LAD has focal 80% stenosis  Left circumflex artery-small left circumflex artery system, no significant stenosis in the marginal branch  Right coronary artery-large vessel, 95% stenosis in the distal portion.     Hemodynamics:  Aortic pressure 136 x 69 with a mean of 116  Heart rate 75 bpm  Pulmonary arterial pressure 79/27 with a mean of 44  Pulmonary capillary wedge pressure 12 mmHg  Right ventricular pressure 63/11 with a EDP 13  Right atrial pressure 11 mmHg  Left ventricular end-diastolic pressure 13 mmHg  Cardiac output 4.7 L/min  Cardiac index 2.33 L/min/m2 by thermodilution method.  Pulmonary vascular resistance 7 Wood units  [place]uel[place] 4.73, favorable  With adenosine no significant response with pulmonary arterial pressures.    Assessment/Plan:    Assessment & Plan  Pulmonary hypertension (HCC)  Pulmonary Hypertension 2/2 Left heart disease (Group II) suspected.  He  may have some RADS when he exercises which I have addressed however would not cause PH.  Pulmonary function testing is not consistent with group III pulmonary hypertension.  CTA is negative for any suggestion of ILD as well as acute VTE.  VQ scan rules out CTEPH.  He has no history of drug abuse or diet pill use.      WHO Groups:  I. CTD testing pending, no other risk factors for PAH. Wedge 12, mPAP 44, RAP 11. LVEDP 13.   II. Mitral valve disease, recent stenting  III. No pulmonary causes of PH identified  IV. VQ scan WNL  V. No known risk factors for group V PH.     Although the cath does appear to be consistent with pre-capillary PH he also has significant left sided disease and his age and lack of risk factors for PAH make a diagnosis of Group II PH more likely.  He is functionally excellent at the point and I would not start therapy regardless of the group at the moment.  Addition of PH therapies would likely cause more problems then it would solve however if he becomes symptomatic and all left sided disease is optimized we could trial some low dose PH specific therapy.     Plan:  - Albuterol as needed prior to exercise  - CTD panel blood work, BNP, BMP, RF and ROMEL  - At this point there is no indication for PH specific therapies   - Follow up in 3-6 months       Orders:    proBrain Natriuretic Peptide, NT; Future    ROMEL IGG KYLE W/RFLX TO ROMEL IGG IFA; Future    RHEUMATOID ARTHRITIS FACTOR; Future    ANTI SCL-70 ABS    SJOGREN'S AB, ANTI-SS-A/-SS-B    Coronary artery disease due to lipid rich plaque  Follows with cardiology s/p stenting         Return in about 3 months (around 4/27/2025), or if symptoms worsen or fail to improve, for labs and f/u on cardiology visit.     This note was generated using voice recognition software which has a chance of producing errors of grammar and possibly content.  I have made every reasonable attempt to find and correct any obvious errors, but it should be expected that some  may not be found prior to finalization of this note.    Time spent in record review prior to patient arrival, reviewing results, and in face-to-face encounter totaled 45 min, excluding any procedures if performed.    Cr Alfaro, MSN, APRN, AGNP-C  [facility]

## 2025-01-27 NOTE — ASSESSMENT & PLAN NOTE
Pulmonary Hypertension 2/2 Left heart disease (Group II) suspected.  He may have some RADS when he exercises which I have addressed however would not cause PH.  Pulmonary function testing is not consistent with group III pulmonary hypertension.  CTA is negative for any suggestion of ILD as well as acute VTE.  VQ scan rules out CTEPH.  He has no history of drug abuse or diet pill use.      WHO Groups:  I. CTD testing pending, no other risk factors for PAH. Wedge 12, mPAP 44, RAP 11. LVEDP 13.   II. Mitral valve disease, recent stenting  III. No pulmonary causes of PH identified  IV. VQ scan WNL  V. No known risk factors for group V PH.     Although the cath does appear to be consistent with pre-capillary PH he also has significant left sided disease and his age and lack of risk factors for PAH make a diagnosis of Group II PH more likely.  He is functionally excellent at the point and I would not start therapy regardless of the group at the moment.  Addition of PH therapies would likely cause more problems then it would solve however if he becomes symptomatic and all left sided disease is optimized we could trial some low dose PH specific therapy.     Plan:  - Albuterol as needed prior to exercise  - CTD panel blood work, BNP, BMP, RF and ROMEL  - At this point there is no indication for PH specific therapies   - Follow up in 3-6 months       Orders:    proBrain Natriuretic Peptide, NT; Future    ROMEL IGG KYLE W/RFLX TO ROMEL IGG IFA; Future    RHEUMATOID ARTHRITIS FACTOR; Future    ANTI SCL-70 ABS    SJOGREN'S AB, ANTI-SS-A/-SS-B

## 2025-02-25 ENCOUNTER — TELEPHONE (OUTPATIENT)
Dept: CARDIOLOGY | Facility: MEDICAL CENTER | Age: 70
End: 2025-02-25
Payer: MEDICARE

## 2025-02-25 NOTE — TELEPHONE ENCOUNTER
Tried to call pt to [facility] f/v. Unable to LVM. Per AM, pt needs to see JG. Sent MyChart. /cg 02/25/25

## 2025-08-18 DIAGNOSIS — I25.10 CORONARY ARTERY DISEASE DUE TO LIPID RICH PLAQUE: ICD-10-CM

## 2025-08-18 DIAGNOSIS — I25.83 CORONARY ARTERY DISEASE DUE TO LIPID RICH PLAQUE: ICD-10-CM
